# Patient Record
Sex: FEMALE | Race: WHITE | NOT HISPANIC OR LATINO | Employment: FULL TIME | URBAN - METROPOLITAN AREA
[De-identification: names, ages, dates, MRNs, and addresses within clinical notes are randomized per-mention and may not be internally consistent; named-entity substitution may affect disease eponyms.]

---

## 2019-07-05 ENCOUNTER — ANESTHESIA EVENT (OUTPATIENT)
Dept: PERIOP | Facility: HOSPITAL | Age: 29
End: 2019-07-05

## 2019-07-05 ENCOUNTER — HOSPITAL ENCOUNTER (OUTPATIENT)
Facility: HOSPITAL | Age: 29
Setting detail: HOSPITAL OUTPATIENT SURGERY
Discharge: HOME | End: 2019-07-05
Attending: OBSTETRICS & GYNECOLOGY | Admitting: OBSTETRICS & GYNECOLOGY

## 2019-07-05 ENCOUNTER — ANESTHESIA (OUTPATIENT)
Dept: PERIOP | Facility: HOSPITAL | Age: 29
End: 2019-07-05

## 2019-07-05 VITALS
BODY MASS INDEX: 23.31 KG/M2 | HEIGHT: 67 IN | SYSTOLIC BLOOD PRESSURE: 115 MMHG | RESPIRATION RATE: 16 BRPM | WEIGHT: 148.5 LBS | DIASTOLIC BLOOD PRESSURE: 71 MMHG | OXYGEN SATURATION: 99 % | HEART RATE: 64 BPM | TEMPERATURE: 98 F

## 2019-07-05 DIAGNOSIS — O02.1 MISSED ABORTION: ICD-10-CM

## 2019-07-05 LAB
ABO GROUP BLD: NORMAL
BASOPHILS # BLD AUTO: 0.07 10*3/MM3 (ref 0–0.2)
BASOPHILS NFR BLD AUTO: 0.7 % (ref 0–1.5)
BLD GP AB SCN SERPL QL: NEGATIVE
DEPRECATED RDW RBC AUTO: 41.3 FL (ref 37–54)
EOSINOPHIL # BLD AUTO: 0.12 10*3/MM3 (ref 0–0.4)
EOSINOPHIL NFR BLD AUTO: 1.1 % (ref 0.3–6.2)
ERYTHROCYTE [DISTWIDTH] IN BLOOD BY AUTOMATED COUNT: 12.6 % (ref 12.3–15.4)
HCT VFR BLD AUTO: 43.7 % (ref 34–46.6)
HGB BLD-MCNC: 14.9 G/DL (ref 12–15.9)
IMM GRANULOCYTES # BLD AUTO: 0.04 10*3/MM3 (ref 0–0.05)
IMM GRANULOCYTES NFR BLD AUTO: 0.4 % (ref 0–0.5)
LYMPHOCYTES # BLD AUTO: 2.79 10*3/MM3 (ref 0.7–3.1)
LYMPHOCYTES NFR BLD AUTO: 25.9 % (ref 19.6–45.3)
MCH RBC QN AUTO: 30.8 PG (ref 26.6–33)
MCHC RBC AUTO-ENTMCNC: 34.1 G/DL (ref 31.5–35.7)
MCV RBC AUTO: 90.5 FL (ref 79–97)
MONOCYTES # BLD AUTO: 0.82 10*3/MM3 (ref 0.1–0.9)
MONOCYTES NFR BLD AUTO: 7.6 % (ref 5–12)
NEUTROPHILS # BLD AUTO: 6.92 10*3/MM3 (ref 1.7–7)
NEUTROPHILS NFR BLD AUTO: 64.3 % (ref 42.7–76)
NRBC BLD AUTO-RTO: 0 /100 WBC (ref 0–0.2)
PLATELET # BLD AUTO: 290 10*3/MM3 (ref 140–450)
PMV BLD AUTO: 11.2 FL (ref 6–12)
RBC # BLD AUTO: 4.83 10*6/MM3 (ref 3.77–5.28)
RH BLD: POSITIVE
T&S EXPIRATION DATE: NORMAL
WBC NRBC COR # BLD: 10.76 10*3/MM3 (ref 3.4–10.8)

## 2019-07-05 PROCEDURE — 86901 BLOOD TYPING SEROLOGIC RH(D): CPT | Performed by: OBSTETRICS & GYNECOLOGY

## 2019-07-05 PROCEDURE — 85025 COMPLETE CBC W/AUTO DIFF WBC: CPT | Performed by: OBSTETRICS & GYNECOLOGY

## 2019-07-05 PROCEDURE — 25010000002 MIDAZOLAM PER 1 MG: Performed by: ANESTHESIOLOGY

## 2019-07-05 PROCEDURE — 88305 TISSUE EXAM BY PATHOLOGIST: CPT | Performed by: OBSTETRICS & GYNECOLOGY

## 2019-07-05 PROCEDURE — 25010000002 PROPOFOL 10 MG/ML EMULSION: Performed by: NURSE ANESTHETIST, CERTIFIED REGISTERED

## 2019-07-05 PROCEDURE — 25010000002 FENTANYL CITRATE (PF) 100 MCG/2ML SOLUTION: Performed by: NURSE ANESTHETIST, CERTIFIED REGISTERED

## 2019-07-05 PROCEDURE — 86850 RBC ANTIBODY SCREEN: CPT | Performed by: OBSTETRICS & GYNECOLOGY

## 2019-07-05 PROCEDURE — 86900 BLOOD TYPING SEROLOGIC ABO: CPT | Performed by: OBSTETRICS & GYNECOLOGY

## 2019-07-05 PROCEDURE — 25010000002 MIDAZOLAM PER 1 MG: Performed by: NURSE ANESTHETIST, CERTIFIED REGISTERED

## 2019-07-05 RX ORDER — MIDAZOLAM HYDROCHLORIDE 1 MG/ML
INJECTION INTRAMUSCULAR; INTRAVENOUS AS NEEDED
Status: DISCONTINUED | OUTPATIENT
Start: 2019-07-05 | End: 2019-07-05 | Stop reason: SURG

## 2019-07-05 RX ORDER — PROPOFOL 10 MG/ML
VIAL (ML) INTRAVENOUS CONTINUOUS PRN
Status: DISCONTINUED | OUTPATIENT
Start: 2019-07-05 | End: 2019-07-05 | Stop reason: SURG

## 2019-07-05 RX ORDER — OXYCODONE HYDROCHLORIDE AND ACETAMINOPHEN 5; 325 MG/1; MG/1
1 TABLET ORAL EVERY 6 HOURS PRN
Qty: 6 TABLET | Refills: 0 | Status: ON HOLD | OUTPATIENT
Start: 2019-07-05 | End: 2020-04-30

## 2019-07-05 RX ORDER — IBUPROFEN 600 MG/1
600 TABLET ORAL EVERY 6 HOURS PRN
Qty: 20 TABLET | Refills: 0 | Status: ON HOLD | OUTPATIENT
Start: 2019-07-05 | End: 2020-04-30

## 2019-07-05 RX ORDER — ONDANSETRON 2 MG/ML
4 INJECTION INTRAMUSCULAR; INTRAVENOUS ONCE AS NEEDED
Status: DISCONTINUED | OUTPATIENT
Start: 2019-07-05 | End: 2019-07-05 | Stop reason: HOSPADM

## 2019-07-05 RX ORDER — PROPOFOL 10 MG/ML
VIAL (ML) INTRAVENOUS AS NEEDED
Status: DISCONTINUED | OUTPATIENT
Start: 2019-07-05 | End: 2019-07-05 | Stop reason: SURG

## 2019-07-05 RX ORDER — MIDAZOLAM HYDROCHLORIDE 1 MG/ML
1 INJECTION INTRAMUSCULAR; INTRAVENOUS
Status: DISCONTINUED | OUTPATIENT
Start: 2019-07-05 | End: 2019-07-05 | Stop reason: HOSPADM

## 2019-07-05 RX ORDER — DIPHENHYDRAMINE HCL 25 MG
25 CAPSULE ORAL
Status: DISCONTINUED | OUTPATIENT
Start: 2019-07-05 | End: 2019-07-05 | Stop reason: HOSPADM

## 2019-07-05 RX ORDER — PROMETHAZINE HYDROCHLORIDE 25 MG/ML
6.25 INJECTION, SOLUTION INTRAMUSCULAR; INTRAVENOUS
Status: DISCONTINUED | OUTPATIENT
Start: 2019-07-05 | End: 2019-07-05 | Stop reason: HOSPADM

## 2019-07-05 RX ORDER — FENTANYL CITRATE 50 UG/ML
50 INJECTION, SOLUTION INTRAMUSCULAR; INTRAVENOUS
Status: DISCONTINUED | OUTPATIENT
Start: 2019-07-05 | End: 2019-07-05 | Stop reason: HOSPADM

## 2019-07-05 RX ORDER — HYDRALAZINE HYDROCHLORIDE 20 MG/ML
5 INJECTION INTRAMUSCULAR; INTRAVENOUS
Status: DISCONTINUED | OUTPATIENT
Start: 2019-07-05 | End: 2019-07-05 | Stop reason: HOSPADM

## 2019-07-05 RX ORDER — FENTANYL CITRATE 50 UG/ML
INJECTION, SOLUTION INTRAMUSCULAR; INTRAVENOUS AS NEEDED
Status: DISCONTINUED | OUTPATIENT
Start: 2019-07-05 | End: 2019-07-05 | Stop reason: SURG

## 2019-07-05 RX ORDER — HYDROCODONE BITARTRATE AND ACETAMINOPHEN 7.5; 325 MG/1; MG/1
1 TABLET ORAL ONCE AS NEEDED
Status: DISCONTINUED | OUTPATIENT
Start: 2019-07-05 | End: 2019-07-05 | Stop reason: HOSPADM

## 2019-07-05 RX ORDER — ACETAMINOPHEN 325 MG/1
650 TABLET ORAL ONCE AS NEEDED
Status: DISCONTINUED | OUTPATIENT
Start: 2019-07-05 | End: 2019-07-05 | Stop reason: HOSPADM

## 2019-07-05 RX ORDER — LIDOCAINE HYDROCHLORIDE 10 MG/ML
0.5 INJECTION, SOLUTION EPIDURAL; INFILTRATION; INTRACAUDAL; PERINEURAL ONCE AS NEEDED
Status: DISCONTINUED | OUTPATIENT
Start: 2019-07-05 | End: 2019-07-05 | Stop reason: HOSPADM

## 2019-07-05 RX ORDER — OXYCODONE AND ACETAMINOPHEN 7.5; 325 MG/1; MG/1
1 TABLET ORAL ONCE AS NEEDED
Status: DISCONTINUED | OUTPATIENT
Start: 2019-07-05 | End: 2019-07-05 | Stop reason: HOSPADM

## 2019-07-05 RX ORDER — DIPHENHYDRAMINE HYDROCHLORIDE 50 MG/ML
12.5 INJECTION INTRAMUSCULAR; INTRAVENOUS
Status: DISCONTINUED | OUTPATIENT
Start: 2019-07-05 | End: 2019-07-05 | Stop reason: HOSPADM

## 2019-07-05 RX ORDER — SODIUM CHLORIDE 0.9 % (FLUSH) 0.9 %
1-10 SYRINGE (ML) INJECTION AS NEEDED
Status: DISCONTINUED | OUTPATIENT
Start: 2019-07-05 | End: 2019-07-05 | Stop reason: HOSPADM

## 2019-07-05 RX ORDER — LABETALOL HYDROCHLORIDE 5 MG/ML
5 INJECTION, SOLUTION INTRAVENOUS
Status: DISCONTINUED | OUTPATIENT
Start: 2019-07-05 | End: 2019-07-05 | Stop reason: HOSPADM

## 2019-07-05 RX ORDER — ACETAMINOPHEN 325 MG/1
650 TABLET ORAL EVERY 6 HOURS PRN
COMMUNITY
End: 2020-06-05 | Stop reason: HOSPADM

## 2019-07-05 RX ORDER — EPHEDRINE SULFATE 50 MG/ML
5 INJECTION, SOLUTION INTRAVENOUS ONCE AS NEEDED
Status: DISCONTINUED | OUTPATIENT
Start: 2019-07-05 | End: 2019-07-05 | Stop reason: HOSPADM

## 2019-07-05 RX ORDER — HYDROMORPHONE HYDROCHLORIDE 1 MG/ML
0.5 INJECTION, SOLUTION INTRAMUSCULAR; INTRAVENOUS; SUBCUTANEOUS
Status: DISCONTINUED | OUTPATIENT
Start: 2019-07-05 | End: 2019-07-05 | Stop reason: HOSPADM

## 2019-07-05 RX ORDER — FLUMAZENIL 0.1 MG/ML
0.2 INJECTION INTRAVENOUS AS NEEDED
Status: DISCONTINUED | OUTPATIENT
Start: 2019-07-05 | End: 2019-07-05 | Stop reason: HOSPADM

## 2019-07-05 RX ORDER — PROMETHAZINE HYDROCHLORIDE 25 MG/1
25 TABLET ORAL ONCE AS NEEDED
Status: DISCONTINUED | OUTPATIENT
Start: 2019-07-05 | End: 2019-07-05 | Stop reason: HOSPADM

## 2019-07-05 RX ORDER — MIDAZOLAM HYDROCHLORIDE 1 MG/ML
2 INJECTION INTRAMUSCULAR; INTRAVENOUS
Status: DISCONTINUED | OUTPATIENT
Start: 2019-07-05 | End: 2019-07-05 | Stop reason: HOSPADM

## 2019-07-05 RX ORDER — NALOXONE HCL 0.4 MG/ML
0.2 VIAL (ML) INJECTION AS NEEDED
Status: DISCONTINUED | OUTPATIENT
Start: 2019-07-05 | End: 2019-07-05 | Stop reason: HOSPADM

## 2019-07-05 RX ORDER — LIDOCAINE HYDROCHLORIDE 10 MG/ML
INJECTION, SOLUTION EPIDURAL; INFILTRATION; INTRACAUDAL; PERINEURAL AS NEEDED
Status: DISCONTINUED | OUTPATIENT
Start: 2019-07-05 | End: 2019-07-05 | Stop reason: HOSPADM

## 2019-07-05 RX ORDER — MAGNESIUM HYDROXIDE 1200 MG/15ML
LIQUID ORAL AS NEEDED
Status: DISCONTINUED | OUTPATIENT
Start: 2019-07-05 | End: 2019-07-05 | Stop reason: HOSPADM

## 2019-07-05 RX ORDER — FAMOTIDINE 10 MG/ML
20 INJECTION, SOLUTION INTRAVENOUS ONCE
Status: COMPLETED | OUTPATIENT
Start: 2019-07-05 | End: 2019-07-05

## 2019-07-05 RX ORDER — PROMETHAZINE HYDROCHLORIDE 25 MG/ML
12.5 INJECTION, SOLUTION INTRAMUSCULAR; INTRAVENOUS ONCE AS NEEDED
Status: DISCONTINUED | OUTPATIENT
Start: 2019-07-05 | End: 2019-07-05 | Stop reason: HOSPADM

## 2019-07-05 RX ORDER — SODIUM CHLORIDE, SODIUM LACTATE, POTASSIUM CHLORIDE, CALCIUM CHLORIDE 600; 310; 30; 20 MG/100ML; MG/100ML; MG/100ML; MG/100ML
9 INJECTION, SOLUTION INTRAVENOUS CONTINUOUS
Status: DISCONTINUED | OUTPATIENT
Start: 2019-07-05 | End: 2019-07-05 | Stop reason: HOSPADM

## 2019-07-05 RX ORDER — IBUPROFEN 600 MG/1
600 TABLET ORAL ONCE AS NEEDED
Status: COMPLETED | OUTPATIENT
Start: 2019-07-05 | End: 2019-07-05

## 2019-07-05 RX ORDER — LIDOCAINE HYDROCHLORIDE 20 MG/ML
INJECTION, SOLUTION INFILTRATION; PERINEURAL AS NEEDED
Status: DISCONTINUED | OUTPATIENT
Start: 2019-07-05 | End: 2019-07-05 | Stop reason: SURG

## 2019-07-05 RX ORDER — PROMETHAZINE HYDROCHLORIDE 25 MG/1
25 SUPPOSITORY RECTAL ONCE AS NEEDED
Status: DISCONTINUED | OUTPATIENT
Start: 2019-07-05 | End: 2019-07-05 | Stop reason: HOSPADM

## 2019-07-05 RX ADMIN — FAMOTIDINE 20 MG: 10 INJECTION INTRAVENOUS at 10:57

## 2019-07-05 RX ADMIN — SODIUM CHLORIDE, POTASSIUM CHLORIDE, SODIUM LACTATE AND CALCIUM CHLORIDE 9 ML/HR: 600; 310; 30; 20 INJECTION, SOLUTION INTRAVENOUS at 10:57

## 2019-07-05 RX ADMIN — IBUPROFEN 600 MG: 600 TABLET ORAL at 13:07

## 2019-07-05 RX ADMIN — DOXYCYCLINE 100 MG: 100 INJECTION, POWDER, LYOPHILIZED, FOR SOLUTION INTRAVENOUS at 11:52

## 2019-07-05 RX ADMIN — PROPOFOL 30 MG: 10 INJECTION, EMULSION INTRAVENOUS at 11:44

## 2019-07-05 RX ADMIN — PROPOFOL 200 MCG/KG/MIN: 10 INJECTION, EMULSION INTRAVENOUS at 11:44

## 2019-07-05 RX ADMIN — Medication 1 MG: at 11:41

## 2019-07-05 RX ADMIN — MIDAZOLAM HYDROCHLORIDE 2 MG: 1 INJECTION, SOLUTION INTRAMUSCULAR; INTRAVENOUS at 10:57

## 2019-07-05 RX ADMIN — FENTANYL CITRATE 25 MCG: 50 INJECTION INTRAMUSCULAR; INTRAVENOUS at 11:41

## 2019-07-05 RX ADMIN — FENTANYL CITRATE 25 MCG: 50 INJECTION INTRAMUSCULAR; INTRAVENOUS at 11:45

## 2019-07-05 RX ADMIN — LIDOCAINE HYDROCHLORIDE 60 MG: 20 INJECTION, SOLUTION INFILTRATION; PERINEURAL at 11:44

## 2019-07-05 NOTE — ANESTHESIA POSTPROCEDURE EVALUATION
Patient: Xochilt Oreilly    Procedure Summary     Date:  07/05/19 Room / Location:  [facility] OR 03 / [facility] MAIN OR    Anesthesia Start:  1141 Anesthesia Stop:  1219    Procedure:  SUCTION DILATATION AND CURETTAGE (N/A Vagina) Diagnosis:      Surgeon:  Kathy Bai MD Provider:  Felipe Butterfield MD    Anesthesia Type:  MAC ASA Status:  2          Anesthesia Type: MAC  Last vitals  BP   116/67 (07/05/19 1305)   Temp   36.7 °C (98 °F) (07/05/19 1235)   Pulse   60 (07/05/19 1305)   Resp   16 (07/05/19 1305)     SpO2   99 % (07/05/19 1305)     Post Anesthesia Care and Evaluation    Patient location during evaluation: bedside  Patient participation: complete - patient participated  Level of consciousness: awake and alert  Pain management: adequate  Airway patency: patent  Anesthetic complications: No anesthetic complications  PONV Status: controlled  Cardiovascular status: acceptable  Respiratory status: acceptable  Hydration status: acceptable

## 2019-07-05 NOTE — OP NOTE
Subjective     Patient Name: Xochilt Oreilly  :  1990  MRN:  [id number]      Date of Service:  19    Surgeon: Kathy Bai MD     Pre-Operative Diagnosis:   6 week Missed     Post-Operative Diagnosis:    6 week Missed     Procedure:    DILATATION AND CURETTAGE WITH SUCTION     Anesthesia: Type: Monitor Anesthesia Care       EBL: 20 cc    Specimen removed:  Products of Conception     Findings: Exam under anesthesia revealed an anteverted, mildly enlarged uterus. Moderate POCs.         Procedure:   Patient was taken operating room where IV sedation was obtained without difficulty.  She was then placed in the dorsal lithotomy position in stirrups and prepped and draped in the normal sterile fashion.  A speculum was placed within the vagina a paracervical block was then performed injecting a  1% lidocaine at the 4 and 7:00 positions of the cervical vaginal junction.  10 cc was injected at each site. The anterior lip of the cervix grasped with a tenaculum.  The cervix was then gently dilated in order to allow passage of a 7  mm curved suction curette.  Suction was tested and noted to be at 60 mmHg.  The curette was then advanced through the cervix up to the uterine fundus and suction was applied with return of moderate amount of products of conception.  Several additional passages with the curette were performed until minimal tissue was noted.  A #1  Sharp curette was then used to perform a sharp curettage and a gritty texture was noted within the cavity and no additional tissue was obtained.  A final passage with the suction curette was performed with no additional tissue noted. The  tenaculum and speculum was then removed.  Sponge and needle counts are correct ×2.  She was awakened from anesthesia and taken to face to recovery in stable condition.     Kathy Bai MD   2019   3:16 PM

## 2019-07-05 NOTE — ANESTHESIA PREPROCEDURE EVALUATION
Anesthesia Evaluation     Patient summary reviewed and Nursing notes reviewed   NPO Solid Status: > 8 hours  NPO Liquid Status: > 2 hours           Airway   Mallampati: II  Neck ROM: full  No difficulty expected  Dental - normal exam     Pulmonary     breath sounds clear to auscultation  Cardiovascular     Rhythm: regular        Neuro/Psych  GI/Hepatic/Renal/Endo    (+)  GERD,      Musculoskeletal     Abdominal    Substance History      OB/GYN    (+) Pregnant,         Other                        Anesthesia Plan    ASA 2     MAC     intravenous induction   Anesthetic plan, all risks, benefits, and alternatives have been provided, discussed and informed consent has been obtained with: patient.

## 2019-07-05 NOTE — DISCHARGE INSTRUCTIONS
**SEE GRIEF RESOURCES ATTACHED**    Moderate Conscious Sedation, Adult, Care After  Refer to this sheet in the next few weeks. These instructions provide you with information on caring for yourself after your procedure. Your health care provider may also give you more specific instructions. Your treatment has been planned according to current medical practices, but problems sometimes occur. Call your health care provider if you have any problems or questions after your procedure.  WHAT TO EXPECT AFTER THE PROCEDURE   After your procedure:  · You may feel sleepy, clumsy, and have poor balance for several hours.  · Vomiting may occur if you eat too soon after the procedure.  HOME CARE INSTRUCTIONS  · Do not participate in any activities where you could become injured for at least 24 hours. Do not:    Drive.    Swim.    Ride a bicycle.    Operate heavy machinery.    Cook.    Use power tools.    Climb ladders.    Work from a high place.  · Do not make important decisions or sign legal documents until you are improved.  · If you vomit, drink water, juice, or soup when you can drink without vomiting. Make sure you have little or no nausea before eating solid foods.  · Only take over-the-counter or prescription medicines for pain, discomfort, or fever as directed by your health care provider.  · Make sure you and your family fully understand everything about the medicines given to you, including what side effects may occur.  · You should not drink alcohol, take sleeping pills, or take medicines that cause drowsiness for at least 24 hours.  · If you smoke, do not smoke without supervision.  · If you are feeling better, you may resume normal activities 24 hours after you were sedated.  · Keep all appointments with your health care provider.  · You should have a responsible adult stay with you for the first 24 hours post procedure.  SEEK MEDICAL CARE IF:  · Your skin is pale or bluish in color.  · You continue to feel  nauseous or vomit.  · Your pain is getting worse and is not helped by medicine.  · You have bleeding or swelling.  · You are still sleepy or feeling clumsy after 24 hours.  SEEK IMMEDIATE MEDICAL CARE IF:  · You develop a rash.  · You have difficulty breathing.  · You develop any type of allergic problem.  · You have a fever.  MAKE SURE YOU:  · Understand these instructions.  · Will watch your condition.  · Will get help right away if you are not doing well or get worse.     This information is not intended to replace advice given to you by your health care provider. Make sure you discuss any questions you have with your health care provider.     Document Released: 10/08/2014 Document Revised: 01/08/2016 Document Reviewed: 10/08/2014  Elsevier Interactive Patient Education ©2016 [employer]

## 2019-07-06 LAB
CYTO UR: NORMAL
LAB AP CASE REPORT: NORMAL
PATH REPORT.FINAL DX SPEC: NORMAL
PATH REPORT.GROSS SPEC: NORMAL

## 2019-10-17 LAB
EXTERNAL HEPATITIS B SURFACE ANTIGEN: NEGATIVE
EXTERNAL HEPATITIS C AB: NORMAL
EXTERNAL RUBELLA QUALITATIVE: NORMAL
EXTERNAL SYPHILIS RPR SCREEN: NORMAL
HIV1 P24 AG SERPL QL IA: NEGATIVE

## 2020-04-30 ENCOUNTER — HOSPITAL ENCOUNTER (EMERGENCY)
Facility: HOSPITAL | Age: 30
Discharge: HOME | End: 2020-05-01
Attending: OBSTETRICS & GYNECOLOGY | Admitting: OBSTETRICS & GYNECOLOGY

## 2020-04-30 VITALS
DIASTOLIC BLOOD PRESSURE: 79 MMHG | RESPIRATION RATE: 18 BRPM | HEIGHT: 66 IN | TEMPERATURE: 98.4 F | WEIGHT: 189 LBS | SYSTOLIC BLOOD PRESSURE: 118 MMHG | BODY MASS INDEX: 30.37 KG/M2 | HEART RATE: 91 BPM

## 2020-04-30 PROBLEM — O13.3 GESTATIONAL HYPERTENSION WITHOUT SIGNIFICANT PROTEINURIA IN THIRD TRIMESTER: Status: ACTIVE | Noted: 2020-04-30

## 2020-04-30 LAB
ALBUMIN SERPL-MCNC: 3.3 G/DL (ref 3.5–5.2)
ALBUMIN/GLOB SERPL: 1.1 G/DL
ALP SERPL-CCNC: 98 U/L (ref 39–117)
ALT SERPL W P-5'-P-CCNC: 13 U/L (ref 1–33)
ANION GAP SERPL CALCULATED.3IONS-SCNC: 11.4 MMOL/L (ref 5–15)
AST SERPL-CCNC: 15 U/L (ref 1–32)
BASOPHILS # BLD AUTO: 0.05 10*3/MM3 (ref 0–0.2)
BASOPHILS NFR BLD AUTO: 0.4 % (ref 0–1.5)
BILIRUB SERPL-MCNC: 0.2 MG/DL (ref 0.2–1.2)
BUN BLD-MCNC: 8 MG/DL (ref 6–20)
BUN/CREAT SERPL: 16 (ref 7–25)
CALCIUM SPEC-SCNC: 9.5 MG/DL (ref 8.6–10.5)
CHLORIDE SERPL-SCNC: 100 MMOL/L (ref 98–107)
CO2 SERPL-SCNC: 21.6 MMOL/L (ref 22–29)
CREAT BLD-MCNC: 0.5 MG/DL (ref 0.57–1)
CREAT UR-MCNC: 101.7 MG/DL
DEPRECATED RDW RBC AUTO: 44.6 FL (ref 37–54)
EOSINOPHIL # BLD AUTO: 0.15 10*3/MM3 (ref 0–0.4)
EOSINOPHIL NFR BLD AUTO: 1.1 % (ref 0.3–6.2)
ERYTHROCYTE [DISTWIDTH] IN BLOOD BY AUTOMATED COUNT: 13.2 % (ref 12.3–15.4)
GFR SERPL CREATININE-BSD FRML MDRD: 144 ML/MIN/1.73
GLOBULIN UR ELPH-MCNC: 3.1 GM/DL
GLUCOSE BLD-MCNC: 94 MG/DL (ref 65–99)
HCT VFR BLD AUTO: 38.3 % (ref 34–46.6)
HGB BLD-MCNC: 13.2 G/DL (ref 12–15.9)
IMM GRANULOCYTES # BLD AUTO: 0.1 10*3/MM3 (ref 0–0.05)
IMM GRANULOCYTES NFR BLD AUTO: 0.7 % (ref 0–0.5)
LYMPHOCYTES # BLD AUTO: 3.06 10*3/MM3 (ref 0.7–3.1)
LYMPHOCYTES NFR BLD AUTO: 22.3 % (ref 19.6–45.3)
MCH RBC QN AUTO: 31.7 PG (ref 26.6–33)
MCHC RBC AUTO-ENTMCNC: 34.5 G/DL (ref 31.5–35.7)
MCV RBC AUTO: 91.8 FL (ref 79–97)
MONOCYTES # BLD AUTO: 1.32 10*3/MM3 (ref 0.1–0.9)
MONOCYTES NFR BLD AUTO: 9.6 % (ref 5–12)
NEUTROPHILS # BLD AUTO: 9.03 10*3/MM3 (ref 1.7–7)
NEUTROPHILS NFR BLD AUTO: 65.9 % (ref 42.7–76)
NRBC BLD AUTO-RTO: 0.1 /100 WBC (ref 0–0.2)
PLATELET # BLD AUTO: 195 10*3/MM3 (ref 140–450)
PMV BLD AUTO: 12.1 FL (ref 6–12)
POTASSIUM BLD-SCNC: 3.6 MMOL/L (ref 3.5–5.2)
PROT SERPL-MCNC: 6.4 G/DL (ref 6–8.5)
PROT UR-MCNC: 23 MG/DL
PROT/CREAT UR: 226.2 MG/G CREA (ref 0–200)
RBC # BLD AUTO: 4.17 10*6/MM3 (ref 3.77–5.28)
SODIUM BLD-SCNC: 133 MMOL/L (ref 136–145)
WBC NRBC COR # BLD: 13.71 10*3/MM3 (ref 3.4–10.8)

## 2020-04-30 PROCEDURE — 80053 COMPREHEN METABOLIC PANEL: CPT | Performed by: OBSTETRICS & GYNECOLOGY

## 2020-04-30 PROCEDURE — 99283 EMERGENCY DEPT VISIT LOW MDM: CPT

## 2020-04-30 PROCEDURE — 87086 URINE CULTURE/COLONY COUNT: CPT | Performed by: OBSTETRICS & GYNECOLOGY

## 2020-04-30 PROCEDURE — 59025 FETAL NON-STRESS TEST: CPT

## 2020-04-30 PROCEDURE — 81001 URINALYSIS AUTO W/SCOPE: CPT | Performed by: OBSTETRICS & GYNECOLOGY

## 2020-04-30 PROCEDURE — 85025 COMPLETE CBC W/AUTO DIFF WBC: CPT | Performed by: OBSTETRICS & GYNECOLOGY

## 2020-04-30 PROCEDURE — 84156 ASSAY OF PROTEIN URINE: CPT | Performed by: OBSTETRICS & GYNECOLOGY

## 2020-04-30 PROCEDURE — 82570 ASSAY OF URINE CREATININE: CPT | Performed by: OBSTETRICS & GYNECOLOGY

## 2020-04-30 RX ORDER — PRENATAL VIT NO.126/IRON/FOLIC 28MG-0.8MG
1 TABLET ORAL DAILY
COMMUNITY
End: 2021-03-29

## 2020-05-01 LAB
AMORPH URATE CRY URNS QL MICRO: ABNORMAL /HPF
BACTERIA UR QL AUTO: ABNORMAL /HPF
BILIRUB UR QL STRIP: NEGATIVE
CLARITY UR: ABNORMAL
COD CRY URNS QL: ABNORMAL /HPF
COLOR UR: YELLOW
GLUCOSE UR STRIP-MCNC: NEGATIVE MG/DL
HGB UR QL STRIP.AUTO: ABNORMAL
HYALINE CASTS UR QL AUTO: ABNORMAL /LPF
KETONES UR QL STRIP: NEGATIVE
LEUKOCYTE ESTERASE UR QL STRIP.AUTO: ABNORMAL
NITRITE UR QL STRIP: NEGATIVE
PH UR STRIP.AUTO: 7 [PH] (ref 5–8)
PROT UR QL STRIP: ABNORMAL
RBC # UR: ABNORMAL /HPF
REF LAB TEST METHOD: ABNORMAL
SP GR UR STRIP: 1.02 (ref 1–1.03)
SQUAMOUS #/AREA URNS HPF: ABNORMAL /HPF
UROBILINOGEN UR QL STRIP: ABNORMAL
WBC UR QL AUTO: ABNORMAL /HPF

## 2020-05-01 PROCEDURE — 99283 EMERGENCY DEPT VISIT LOW MDM: CPT

## 2020-05-01 PROCEDURE — 59025 FETAL NON-STRESS TEST: CPT

## 2020-05-01 NOTE — OBED NOTES
[facility] Provider Note        2020 23:03    Xochilt Oreilly is a 30 y.o.  at 34w2d RENAN  2020, by Other Basis who presents for : Elevated Blood Pressure (140/93.  +FM, Rare contraction, Denies leakage or bleeding)          HPI:     31yo  at 34w2d. Recently seen in office and has been being evaluated for elevated BPs.  Turned in 24hr urine yesterday but does not know results.  Felt not well at home and checked he BP several times and said it averaged 140s/90s. Called and was directed to be evaluated.    On arrival patient denies HA, blurry vision, RUQ or epigastric pain.  She has no obstetric complaints       No VB/LOF/HA/visual changes/N/V/F/C/dysuria     Active fetus Yes   denies ROM  deniescontractions, cramping   denies bleeding    Prenatal care with Kathy Bai MD complicated by evaluation for elevated BP this week., BMI >30    There are no active problems to display for this patient.        POB:   OB History    Para Term  AB Living   2 0 0 0 1 0   SAB TAB Ectopic Molar Multiple Live Births   1 0 0 0 0 0      # Outcome Date GA Lbr Chandu/2nd Weight Sex Delivery Anes PTL Lv   2 Current            1 SAB 19 10w0d             PMH:   Past Medical History:   Diagnosis Date   • Anxiety     had short term treatmeny    • Environmental allergies    • GERD (gastroesophageal reflux disease)    • Gestational hypertension     elevated for 1 week   • Hypoglycemia    • Miscarriage      PSH:   Past Surgical History:   Procedure Laterality Date   • D&C WITH SUCTION N/A 2019    Procedure: SUCTION DILATATION AND CURETTAGE;  Surgeon: Kathy Bai MD;  Location: [facility] MAIN OR;  Service: Obstetrics/Gynecology   • EAR TUBES     • TONSILLECTOMY       FH:    Family History   Problem Relation Age of Onset   • Migraines Mother    • No Known Problems Father    • No Known Problems Sister    • Malig Hyperthermia Neg Hx      SH:   Social History     Tobacco Use   • Smoking  "status: Never Smoker   • Smokeless tobacco: Never Used   Substance Use Topics   • Alcohol use: No   • Drug use: No       Allergies: Norco [hydrocodone-acetaminophen]; Septra [sulfamethoxazole-trimethoprim]; and Shellfish-derived products    Medications:   Medications Prior to Admission   Medication Sig Dispense Refill Last Dose   • acetaminophen (TYLENOL) 325 MG tablet Take 650 mg by mouth Every 6 (Six) Hours As Needed for Mild Pain .   Past Week at Unknown time   • cetirizine (zyrTEC) 10 MG tablet Take 10 mg by mouth Daily.   4/29/2020 at 2130   • Prenatal Vit-Fe Fumarate-FA (PRENATAL, CLASSIC, VITAMIN) 28-0.8 MG tablet tablet Take 1 tablet by mouth Daily.   4/29/2020 at 2030   • psyllium (METAMUCIL) 58.6 % packet Take 1 packet by mouth Daily.   4/29/2020 at 2130   • B Complex Vitamins (VITAMIN-B COMPLEX PO) Take 1 tablet by mouth Daily.   More than a month at Unknown time   • magnesium oxide (MAG-OX) 400 tablet tablet Take 400 mg by mouth Daily.   More than a month at Unknown time       Review of Systems  A 14 system review was completed and negative except for what is noted in the HPI or below  Pertinent items are noted in HPI, all other systems reviewed and negative      Physical exam    Temp:  [98.4 °F (36.9 °C)] 98.4 °F (36.9 °C)  Heart Rate:  [110-118] 110  Resp:  [18] 18  BP: (125-137)/(85-87) 125/87  Estimated body mass index is 30.51 kg/m² as calculated from the following:    Height as of this encounter: 167.6 cm (66\").    Weight as of this encounter: 85.7 kg (189 lb).    General appearance - alert, well appearing, and in no distress and oriented to person, place, and time  Abdomen - soft, nontender, nondistended, no masses or organomegaly  no rebound tenderness noted  NO RUQ or epigastric tenderness. Gravid, uterus nontender  Extremities - mild non-pitting bilateral lower extremity edema, DTRs 2+, NO clonus      Uterine Activity Assessment  Method: palpation, external tocotransducer    Membranes: Intact   "                  U/S reviewed: not performed.       External Prenatal Results     Pregnancy Outside Results - Transcribed From Office Records - See Scanned Records For Details     Test Value Date Time    Hgb 14.9 g/dL 07/05/19 1001    Hct 43.7 % 07/05/19 1001    ABO A  07/05/19 1002    Rh Positive  07/05/19 1002    Antibody Screen Negative  07/05/19 1002    Glucose Fasting GTT       Glucose Tolerance Test 1 hour       Glucose Tolerance Test 3 hour       Gonorrhea (discrete)       Chlamydia (discrete)       RPR       VDRL       Syphilis Antibody       Rubella       HBsAg       Herpes Simplex Virus PCR       Herpes Simplex VIrus Culture       HIV       Hep C RNA Quant PCR       Hep C Antibody       AFP       Group B Strep       GBS Susceptibility to Clindamycin       GBS Susceptibility to Erythromycin       Fetal Fibronectin       Genetic Testing, Maternal Blood             Drug Screening     Test Value Date Time    Urine Drug Screen       Amphetamine Screen       Barbiturate Screen       Benzodiazepine Screen       Methadone Screen       Phencyclidine Screen       Opiates Screen       THC Screen       Cocaine Screen       Propoxyphene Screen       Buprenorphine Screen       Methamphetamine Screen       Oxycodone Screen       Tricyclic Antidepressants Screen                 CBC w/ diff:   Lab Results   Component Value Date     04/30/2020    HGB 13.2 04/30/2020    HCT 38.3 04/30/2020    MCV 91.8 04/30/2020    RDW 13.2 04/30/2020    WBC 13.71 (H) 04/30/2020    NEUTRORELPCT 65.9 04/30/2020    AUTOIGPER 0.7 (H) 04/30/2020    LYMPHORELPCT 22.3 04/30/2020    MONORELPCT 9.6 04/30/2020    EOSRELPCT 1.1 04/30/2020    BASORELPCT 0.4 04/30/2020     CMP:   Lab Results   Component Value Date     (L) 04/30/2020    K 3.6 04/30/2020     04/30/2020    CO2 21.6 (L) 04/30/2020    BUN 8 04/30/2020    CREATININE 0.50 (L) 04/30/2020    GLUCOSE 94 04/30/2020    ALBUMIN 3.30 (L) 04/30/2020    CALCIUM 9.5 04/30/2020    AST  15 2020    ALT 13 2020    BILITOT 0.2 2020     UA:  No results found for: SQUAMEPIUA, SPECGRAVUR, KETONESU, BLOODU, LEUKOCYTESUR, NITRITEU, RBCUA, WBCUA, BACTERIA     URINE PROT/Cr RATIO: 0.22    NST REACTIVE  [place]: no contractions    Impression:   @ 34w2d .  Final Diagnosis:  1. Elevated Blood pressure in pregnancy - NORMAL Blood Pressure here in [facility]   Normal PIH labs    Plan:  1. Discharge to home.    2. Plan of care has been reviewed with patient and    3.  Risks, benefits of treatment plan have been discussed.  4.  All questions have been answered.  5.  PIH precautions given  6. Obstetric precautions  7.  Call office in am to review visit at [facility] and review next prenatal visit appointment date     Roscoe Luz MD  2020  23:03

## 2020-05-02 LAB — BACTERIA SPEC AEROBE CULT: ABNORMAL

## 2020-05-11 LAB — EXTERNAL GROUP B STREP ANTIGEN: NEGATIVE

## 2020-06-02 ENCOUNTER — ANESTHESIA EVENT (OUTPATIENT)
Dept: LABOR AND DELIVERY | Facility: HOSPITAL | Age: 30
End: 2020-06-02

## 2020-06-02 ENCOUNTER — ANESTHESIA (OUTPATIENT)
Dept: LABOR AND DELIVERY | Facility: HOSPITAL | Age: 30
End: 2020-06-02

## 2020-06-02 ENCOUNTER — HOSPITAL ENCOUNTER (INPATIENT)
Facility: HOSPITAL | Age: 30
LOS: 3 days | Discharge: HOME | End: 2020-06-05
Attending: OBSTETRICS & GYNECOLOGY | Admitting: OBSTETRICS & GYNECOLOGY

## 2020-06-02 PROBLEM — Z34.90 PREGNANCY: Status: ACTIVE | Noted: 2020-06-02

## 2020-06-02 LAB
ABO GROUP BLD: NORMAL
ALBUMIN SERPL-MCNC: 3.3 G/DL (ref 3.5–5.2)
ALBUMIN/GLOB SERPL: 1.2 G/DL
ALP SERPL-CCNC: 113 U/L (ref 39–117)
ALT SERPL W P-5'-P-CCNC: 11 U/L (ref 1–33)
ANION GAP SERPL CALCULATED.3IONS-SCNC: 11.7 MMOL/L (ref 5–15)
AST SERPL-CCNC: 17 U/L (ref 1–32)
BASOPHILS # BLD AUTO: 0.03 10*3/MM3 (ref 0–0.2)
BASOPHILS NFR BLD AUTO: 0.3 % (ref 0–1.5)
BILIRUB SERPL-MCNC: 0.3 MG/DL (ref 0.2–1.2)
BLD GP AB SCN SERPL QL: NEGATIVE
BUN BLD-MCNC: 11 MG/DL (ref 6–20)
BUN/CREAT SERPL: 20 (ref 7–25)
CALCIUM SPEC-SCNC: 9.1 MG/DL (ref 8.6–10.5)
CHLORIDE SERPL-SCNC: 100 MMOL/L (ref 98–107)
CO2 SERPL-SCNC: 20.3 MMOL/L (ref 22–29)
CREAT BLD-MCNC: 0.55 MG/DL (ref 0.57–1)
DEPRECATED RDW RBC AUTO: 44.2 FL (ref 37–54)
EOSINOPHIL # BLD AUTO: 0.04 10*3/MM3 (ref 0–0.4)
EOSINOPHIL NFR BLD AUTO: 0.3 % (ref 0.3–6.2)
ERYTHROCYTE [DISTWIDTH] IN BLOOD BY AUTOMATED COUNT: 13.1 % (ref 12.3–15.4)
EXPIRATION DATE: ABNORMAL
GFR SERPL CREATININE-BSD FRML MDRD: 129 ML/MIN/1.73
GLOBULIN UR ELPH-MCNC: 2.7 GM/DL
GLUCOSE BLD-MCNC: 92 MG/DL (ref 65–99)
HCT VFR BLD AUTO: 40.1 % (ref 34–46.6)
HGB BLD-MCNC: 14 G/DL (ref 12–15.9)
LYMPHOCYTES # BLD AUTO: 2.11 10*3/MM3 (ref 0.7–3.1)
LYMPHOCYTES NFR BLD AUTO: 17.9 % (ref 19.6–45.3)
Lab: ABNORMAL
MCH RBC QN AUTO: 32 PG (ref 26.6–33)
MCHC RBC AUTO-ENTMCNC: 34.9 G/DL (ref 31.5–35.7)
MCV RBC AUTO: 91.8 FL (ref 79–97)
MONOCYTES # BLD AUTO: 1 10*3/MM3 (ref 0.1–0.9)
MONOCYTES NFR BLD AUTO: 8.5 % (ref 5–12)
NEUTROPHILS # BLD AUTO: 8.53 10*3/MM3 (ref 1.7–7)
NEUTROPHILS NFR BLD AUTO: 72.2 % (ref 42.7–76)
PLATELET # BLD AUTO: 148 10*3/MM3 (ref 140–450)
PMV BLD AUTO: 13.2 FL (ref 6–12)
POTASSIUM BLD-SCNC: 3.9 MMOL/L (ref 3.5–5.2)
PROT SERPL-MCNC: 6 G/DL (ref 6–8.5)
PROT UR STRIP-MCNC: ABNORMAL MG/DL
RBC # BLD AUTO: 4.37 10*6/MM3 (ref 3.77–5.28)
RH BLD: POSITIVE
SARS-COV-2 RNA RESP QL NAA+PROBE: NOT DETECTED
SODIUM BLD-SCNC: 132 MMOL/L (ref 136–145)
T&S EXPIRATION DATE: NORMAL
WBC NRBC COR # BLD: 11.81 10*3/MM3 (ref 3.4–10.8)

## 2020-06-02 PROCEDURE — 81002 URINALYSIS NONAUTO W/O SCOPE: CPT | Performed by: OBSTETRICS & GYNECOLOGY

## 2020-06-02 PROCEDURE — 86850 RBC ANTIBODY SCREEN: CPT | Performed by: OBSTETRICS & GYNECOLOGY

## 2020-06-02 PROCEDURE — 80053 COMPREHEN METABOLIC PANEL: CPT | Performed by: OBSTETRICS & GYNECOLOGY

## 2020-06-02 PROCEDURE — 87635 SARS-COV-2 COVID-19 AMP PRB: CPT | Performed by: OBSTETRICS & GYNECOLOGY

## 2020-06-02 PROCEDURE — 10907ZC DRAINAGE OF AMNIOTIC FLUID, THERAPEUTIC FROM PRODUCTS OF CONCEPTION, VIA NATURAL OR ARTIFICIAL OPENING: ICD-10-PCS | Performed by: OBSTETRICS & GYNECOLOGY

## 2020-06-02 PROCEDURE — 85025 COMPLETE CBC W/AUTO DIFF WBC: CPT | Performed by: OBSTETRICS & GYNECOLOGY

## 2020-06-02 PROCEDURE — 3E0P7VZ INTRODUCTION OF HORMONE INTO FEMALE REPRODUCTIVE, VIA NATURAL OR ARTIFICIAL OPENING: ICD-10-PCS | Performed by: OBSTETRICS & GYNECOLOGY

## 2020-06-02 PROCEDURE — 86900 BLOOD TYPING SEROLOGIC ABO: CPT | Performed by: OBSTETRICS & GYNECOLOGY

## 2020-06-02 PROCEDURE — 3E033VJ INTRODUCTION OF OTHER HORMONE INTO PERIPHERAL VEIN, PERCUTANEOUS APPROACH: ICD-10-PCS | Performed by: OBSTETRICS & GYNECOLOGY

## 2020-06-02 PROCEDURE — 86901 BLOOD TYPING SEROLOGIC RH(D): CPT | Performed by: OBSTETRICS & GYNECOLOGY

## 2020-06-02 RX ORDER — ONDANSETRON 2 MG/ML
4 INJECTION INTRAMUSCULAR; INTRAVENOUS ONCE AS NEEDED
Status: COMPLETED | OUTPATIENT
Start: 2020-06-02 | End: 2020-06-03

## 2020-06-02 RX ORDER — DIPHENHYDRAMINE HYDROCHLORIDE 50 MG/ML
12.5 INJECTION INTRAMUSCULAR; INTRAVENOUS EVERY 8 HOURS PRN
Status: DISCONTINUED | OUTPATIENT
Start: 2020-06-02 | End: 2020-06-03 | Stop reason: HOSPADM

## 2020-06-02 RX ORDER — SODIUM CHLORIDE 0.9 % (FLUSH) 0.9 %
10 SYRINGE (ML) INJECTION EVERY 12 HOURS SCHEDULED
Status: DISCONTINUED | OUTPATIENT
Start: 2020-06-02 | End: 2020-06-03

## 2020-06-02 RX ORDER — SODIUM CHLORIDE, SODIUM LACTATE, POTASSIUM CHLORIDE, CALCIUM CHLORIDE 600; 310; 30; 20 MG/100ML; MG/100ML; MG/100ML; MG/100ML
125 INJECTION, SOLUTION INTRAVENOUS CONTINUOUS
Status: DISCONTINUED | OUTPATIENT
Start: 2020-06-02 | End: 2020-06-03

## 2020-06-02 RX ORDER — FAMOTIDINE 10 MG/ML
20 INJECTION, SOLUTION INTRAVENOUS ONCE AS NEEDED
Status: COMPLETED | OUTPATIENT
Start: 2020-06-02 | End: 2020-06-03

## 2020-06-02 RX ORDER — EPHEDRINE SULFATE 50 MG/ML
5 INJECTION, SOLUTION INTRAVENOUS
Status: DISCONTINUED | OUTPATIENT
Start: 2020-06-02 | End: 2020-06-03 | Stop reason: HOSPADM

## 2020-06-02 RX ORDER — SODIUM CHLORIDE 0.9 % (FLUSH) 0.9 %
10 SYRINGE (ML) INJECTION AS NEEDED
Status: DISCONTINUED | OUTPATIENT
Start: 2020-06-02 | End: 2020-06-03

## 2020-06-02 RX ADMIN — SODIUM CHLORIDE, POTASSIUM CHLORIDE, SODIUM LACTATE AND CALCIUM CHLORIDE 125 ML/HR: 600; 310; 30; 20 INJECTION, SOLUTION INTRAVENOUS at 17:29

## 2020-06-02 RX ADMIN — DINOPROSTONE 10 MG: 10 INSERT VAGINAL at 13:19

## 2020-06-02 RX ADMIN — SODIUM CHLORIDE, POTASSIUM CHLORIDE, SODIUM LACTATE AND CALCIUM CHLORIDE 125 ML/HR: 600; 310; 30; 20 INJECTION, SOLUTION INTRAVENOUS at 13:23

## 2020-06-02 RX ADMIN — SODIUM CHLORIDE, POTASSIUM CHLORIDE, SODIUM LACTATE AND CALCIUM CHLORIDE 500 ML: 600; 310; 30; 20 INJECTION, SOLUTION INTRAVENOUS at 10:21

## 2020-06-02 NOTE — H&P
[facility]  Obstetric History and Physical    Patient Name: Xochilt Oreilly  :  1990  MRN:  [id number]      Chief Complaint   Patient presents with   • sent down from office     pt sent down from Dr. Bai' office for high blood pressures. Pt reports 160/100 bps in office. Pt denies KABA or visual changes. +Fm and LOF       Subjective     Patient is a 30 y.o. female  currently at 39w0d, who presents From the office for evaluation of hypertension in pregnancy with a blood pressure of 160/100.  Since admission blood pressures have been more labile ranging in the 140-150/80-90 range.  Preeclamptic lab work is normal.  Patient states she feels good fetal movement.  Denies any complaints of headache or visual changes..        Objective       Vital Signs Range for the last 24 hours  Temperature: Temp:  [98.1 °F (36.7 °C)] 98.1 °F (36.7 °C)   Temp Source: Temp src: Oral   BP: BP: (133-153)/() 133/85   Pulse: Heart Rate:  [101-150] 110   Respirations: Resp:  [18] 18                   Physical Examination:     General :  Alert in NAD  Abdomen: Gravid, NT    Presentation: vtx   Cervix: Exam by:  Dr Bai   Dilation:  1   Effacement:  long             Fetal Heart Rate Assessment   Method: Fetal HR Assessment Method: external   Beats/min: Fetal HR (beats/min): 145   Baseline: Fetal Heart Baseline Rate: normal range   Varibility: Fetal HR Variability: moderate (amplitude range 6 to 25 bpm)   Accels: Fetal HR Accelerations: greater than/equal to 15 bpm, lasting at least 15 seconds           Results from last 7 days   Lab Units 20  1020   WBC 10*3/mm3 11.81*   HEMOGLOBIN g/dL 14.0   HEMATOCRIT % 40.1   PLATELETS 10*3/mm3 148       Assessment/Plan     1.  Intrauterine pregnancy at 39w0d weeks gestation With PIH.  Patient cervical exam is unfavorable.  Plan cervadil for cervical ripening.   reactive fetal status.  Anticipate .  Plan of care has been reviewed with patient and family.  All questions  answered.      Pregnancy        H&P updated. The patient was examined and the following changes are noted above.         Tamy Sims MD  6/2/2020  13:32

## 2020-06-02 NOTE — ANESTHESIA PREPROCEDURE EVALUATION
Anesthesia Evaluation     Patient summary reviewed and Nursing notes reviewed   no history of anesthetic complications:  NPO Solid Status: > 6 hours  NPO Liquid Status: > 6 hours           Airway   Mallampati: II  TM distance: >3 FB  Neck ROM: full  no difficulty expected and No difficulty expected  Dental - normal exam     Pulmonary - negative pulmonary ROS and normal exam    breath sounds clear to auscultation  (-) rhonchi, decreased breath sounds, wheezes, rales, stridor  Cardiovascular - negative cardio ROS and normal exam    NYHA Classification: I  Rhythm: regular  Rate: normal    (-) murmur, weak pulses, friction rub, systolic click, carotid bruits, JVD, peripheral edema      Neuro/Psych- negative ROS  GI/Hepatic/Renal/Endo    (+)  GERD,      Musculoskeletal (-) negative ROS    Abdominal  - normal exam    Abdomen: soft.   Substance History - negative use     OB/GYN    (+) Pregnant, pregnancy induced hypertension        Other - negative ROS                       Anesthesia Plan    ASA 2     epidural     intravenous induction     Anesthetic plan, all risks, benefits, and alternatives have been provided, discussed and informed consent has been obtained with: patient.

## 2020-06-03 LAB
ATMOSPHERIC PRESS: 749.6 MMHG
BASE EXCESS BLDCOA CALC-SCNC: -3.1 MMOL/L
BASOPHILS # BLD AUTO: 0.06 10*3/MM3 (ref 0–0.2)
BASOPHILS NFR BLD AUTO: 0.3 % (ref 0–1.5)
BDY SITE: ABNORMAL
DEPRECATED RDW RBC AUTO: 45.6 FL (ref 37–54)
DIFFERENTIAL METHOD BLD: ABNORMAL
EOSINOPHIL # BLD AUTO: 0 10*3/MM3 (ref 0–0.4)
EOSINOPHIL NFR BLD AUTO: 0 % (ref 0.3–6.2)
ERYTHROCYTE [DISTWIDTH] IN BLOOD BY AUTOMATED COUNT: 13.4 % (ref 12.3–15.4)
HCO3 BLDCOA-SCNC: 26.3 MMOL/L (ref 22–28)
HCT VFR BLD AUTO: 37.2 % (ref 34–46.6)
HGB BLD-MCNC: 12.8 G/DL (ref 12–15.9)
IMM GRANULOCYTES # BLD AUTO: 0.17 10*3/MM3 (ref 0–0.05)
IMM GRANULOCYTES NFR BLD AUTO: 0.7 % (ref 0–0.5)
LYMPHOCYTES # BLD AUTO: 1.07 10*3/MM3 (ref 0.7–3.1)
LYMPHOCYTES NFR BLD AUTO: 4.5 % (ref 19.6–45.3)
MCH RBC QN AUTO: 31.9 PG (ref 26.6–33)
MCHC RBC AUTO-ENTMCNC: 34.4 G/DL (ref 31.5–35.7)
MCV RBC AUTO: 92.8 FL (ref 79–97)
MODALITY: ABNORMAL
MONOCYTES # BLD AUTO: 1.62 10*3/MM3 (ref 0.1–0.9)
MONOCYTES NFR BLD AUTO: 6.9 % (ref 5–12)
NEUTROPHILS # BLD AUTO: 20.6 10*3/MM3 (ref 1.7–7)
NEUTROPHILS NFR BLD AUTO: 87.6 % (ref 42.7–76)
NOTE: ABNORMAL
NRBC BLD AUTO-RTO: 0 /100 WBC (ref 0–0.2)
PCO2 BLDCOA: 61.9 MMHG (ref 43–63)
PH BLDCOA: 7.24 PH UNITS (ref 7.18–7.34)
PLATELET # BLD AUTO: 144 10*3/MM3 (ref 140–450)
PMV BLD AUTO: 12.8 FL (ref 6–12)
PO2 BLDCOA: 17.1 MMHG (ref 12–26)
RBC # BLD AUTO: 4.01 10*6/MM3 (ref 3.77–5.28)
SAO2 % BLDCOA: 17.5 % (ref 92–99)
WBC # BLD AUTO: 23.52 10*3/MM3 (ref 3.4–10.8)
WBC NRBC COR # BLD: 23.52 10*3/MM3 (ref 3.4–10.8)

## 2020-06-03 PROCEDURE — C1755 CATHETER, INTRASPINAL: HCPCS | Performed by: ANESTHESIOLOGY

## 2020-06-03 PROCEDURE — 25010000002 ROPIVACAINE PER 1 MG: Performed by: ANESTHESIOLOGY

## 2020-06-03 PROCEDURE — 82803 BLOOD GASES ANY COMBINATION: CPT

## 2020-06-03 PROCEDURE — 25010000002 FENTANYL CITRATE (PF) 2500 MCG/50ML SOLUTION: Performed by: ANESTHESIOLOGY

## 2020-06-03 PROCEDURE — 0KQM0ZZ REPAIR PERINEUM MUSCLE, OPEN APPROACH: ICD-10-PCS | Performed by: OBSTETRICS & GYNECOLOGY

## 2020-06-03 PROCEDURE — 85027 COMPLETE CBC AUTOMATED: CPT | Performed by: OBSTETRICS & GYNECOLOGY

## 2020-06-03 PROCEDURE — C1755 CATHETER, INTRASPINAL: HCPCS

## 2020-06-03 PROCEDURE — 25010000002 ONDANSETRON PER 1 MG: Performed by: ANESTHESIOLOGY

## 2020-06-03 RX ORDER — HYDROXYZINE 50 MG/1
50 TABLET, FILM COATED ORAL NIGHTLY PRN
Status: DISCONTINUED | OUTPATIENT
Start: 2020-06-03 | End: 2020-06-05 | Stop reason: HOSPADM

## 2020-06-03 RX ORDER — OXYTOCIN-SODIUM CHLORIDE 0.9% IV SOLN 30 UNIT/500ML 30-0.9/5 UT/ML-%
125 SOLUTION INTRAVENOUS CONTINUOUS PRN
Status: COMPLETED | OUTPATIENT
Start: 2020-06-03 | End: 2020-06-03

## 2020-06-03 RX ORDER — OXYTOCIN-SODIUM CHLORIDE 0.9% IV SOLN 30 UNIT/500ML 30-0.9/5 UT/ML-%
999 SOLUTION INTRAVENOUS ONCE
Status: DISCONTINUED | OUTPATIENT
Start: 2020-06-03 | End: 2020-06-05 | Stop reason: HOSPADM

## 2020-06-03 RX ORDER — MINERAL OIL
OIL (ML) MISCELLANEOUS ONCE AS NEEDED
Status: COMPLETED | OUTPATIENT
Start: 2020-06-03 | End: 2020-06-03

## 2020-06-03 RX ORDER — ONDANSETRON 2 MG/ML
4 INJECTION INTRAMUSCULAR; INTRAVENOUS EVERY 6 HOURS PRN
Status: DISCONTINUED | OUTPATIENT
Start: 2020-06-03 | End: 2020-06-05 | Stop reason: HOSPADM

## 2020-06-03 RX ORDER — PHYTONADIONE 1 MG/.5ML
INJECTION, EMULSION INTRAMUSCULAR; INTRAVENOUS; SUBCUTANEOUS
Status: DISPENSED
Start: 2020-06-03 | End: 2020-06-04

## 2020-06-03 RX ORDER — PROMETHAZINE HYDROCHLORIDE 25 MG/ML
12.5 INJECTION, SOLUTION INTRAMUSCULAR; INTRAVENOUS EVERY 6 HOURS PRN
Status: DISCONTINUED | OUTPATIENT
Start: 2020-06-03 | End: 2020-06-05 | Stop reason: HOSPADM

## 2020-06-03 RX ORDER — ERYTHROMYCIN 5 MG/G
OINTMENT OPHTHALMIC
Status: DISPENSED
Start: 2020-06-03 | End: 2020-06-04

## 2020-06-03 RX ORDER — BISACODYL 10 MG
10 SUPPOSITORY, RECTAL RECTAL DAILY PRN
Status: DISCONTINUED | OUTPATIENT
Start: 2020-06-04 | End: 2020-06-05 | Stop reason: HOSPADM

## 2020-06-03 RX ORDER — HYDROCORTISONE 25 MG/G
1 CREAM TOPICAL AS NEEDED
Status: DISCONTINUED | OUTPATIENT
Start: 2020-06-03 | End: 2020-06-05 | Stop reason: HOSPADM

## 2020-06-03 RX ORDER — CALCIUM CARBONATE 200(500)MG
2 TABLET,CHEWABLE ORAL 3 TIMES DAILY PRN
Status: DISCONTINUED | OUTPATIENT
Start: 2020-06-03 | End: 2020-06-05 | Stop reason: HOSPADM

## 2020-06-03 RX ORDER — OXYTOCIN-SODIUM CHLORIDE 0.9% IV SOLN 30 UNIT/500ML 30-0.9/5 UT/ML-%
250 SOLUTION INTRAVENOUS CONTINUOUS
Status: ACTIVE | OUTPATIENT
Start: 2020-06-03 | End: 2020-06-03

## 2020-06-03 RX ORDER — METHYLERGONOVINE MALEATE 0.2 MG/ML
200 INJECTION INTRAVENOUS ONCE AS NEEDED
Status: DISCONTINUED | OUTPATIENT
Start: 2020-06-03 | End: 2020-06-03 | Stop reason: HOSPADM

## 2020-06-03 RX ORDER — OXYTOCIN-SODIUM CHLORIDE 0.9% IV SOLN 30 UNIT/500ML 30-0.9/5 UT/ML-%
1-20 SOLUTION INTRAVENOUS
Status: DISCONTINUED | OUTPATIENT
Start: 2020-06-03 | End: 2020-06-03

## 2020-06-03 RX ORDER — PROMETHAZINE HYDROCHLORIDE 25 MG/1
12.5 SUPPOSITORY RECTAL EVERY 6 HOURS PRN
Status: DISCONTINUED | OUTPATIENT
Start: 2020-06-03 | End: 2020-06-05 | Stop reason: HOSPADM

## 2020-06-03 RX ORDER — ONDANSETRON 4 MG/1
4 TABLET, FILM COATED ORAL EVERY 8 HOURS PRN
Status: DISCONTINUED | OUTPATIENT
Start: 2020-06-03 | End: 2020-06-05 | Stop reason: HOSPADM

## 2020-06-03 RX ORDER — OXYCODONE HYDROCHLORIDE AND ACETAMINOPHEN 5; 325 MG/1; MG/1
1 TABLET ORAL EVERY 4 HOURS PRN
Status: DISCONTINUED | OUTPATIENT
Start: 2020-06-03 | End: 2020-06-05 | Stop reason: HOSPADM

## 2020-06-03 RX ORDER — MISOPROSTOL 200 UG/1
800 TABLET ORAL AS NEEDED
Status: DISCONTINUED | OUTPATIENT
Start: 2020-06-03 | End: 2020-06-03 | Stop reason: HOSPADM

## 2020-06-03 RX ORDER — MISOPROSTOL 200 UG/1
600 TABLET ORAL ONCE AS NEEDED
Status: DISCONTINUED | OUTPATIENT
Start: 2020-06-03 | End: 2020-06-05 | Stop reason: HOSPADM

## 2020-06-03 RX ORDER — CARBOPROST TROMETHAMINE 250 UG/ML
250 INJECTION, SOLUTION INTRAMUSCULAR AS NEEDED
Status: DISCONTINUED | OUTPATIENT
Start: 2020-06-03 | End: 2020-06-03 | Stop reason: HOSPADM

## 2020-06-03 RX ORDER — LANOLIN
CREAM (ML) TOPICAL
Status: DISCONTINUED | OUTPATIENT
Start: 2020-06-03 | End: 2020-06-05 | Stop reason: HOSPADM

## 2020-06-03 RX ORDER — OXYTOCIN-SODIUM CHLORIDE 0.9% IV SOLN 30 UNIT/500ML 30-0.9/5 UT/ML-%
999 SOLUTION INTRAVENOUS ONCE
Status: COMPLETED | OUTPATIENT
Start: 2020-06-03 | End: 2020-06-03

## 2020-06-03 RX ORDER — OXYCODONE AND ACETAMINOPHEN 10; 325 MG/1; MG/1
1 TABLET ORAL EVERY 4 HOURS PRN
Status: DISCONTINUED | OUTPATIENT
Start: 2020-06-03 | End: 2020-06-05 | Stop reason: HOSPADM

## 2020-06-03 RX ORDER — PROMETHAZINE HYDROCHLORIDE 25 MG/1
25 TABLET ORAL EVERY 6 HOURS PRN
Status: DISCONTINUED | OUTPATIENT
Start: 2020-06-03 | End: 2020-06-05 | Stop reason: HOSPADM

## 2020-06-03 RX ORDER — IBUPROFEN 800 MG/1
800 TABLET ORAL EVERY 8 HOURS PRN
Status: DISCONTINUED | OUTPATIENT
Start: 2020-06-03 | End: 2020-06-05 | Stop reason: HOSPADM

## 2020-06-03 RX ORDER — DOCUSATE SODIUM 100 MG/1
100 CAPSULE, LIQUID FILLED ORAL 2 TIMES DAILY
Status: DISCONTINUED | OUTPATIENT
Start: 2020-06-03 | End: 2020-06-05 | Stop reason: HOSPADM

## 2020-06-03 RX ADMIN — OXYCODONE HYDROCHLORIDE AND ACETAMINOPHEN 1 TABLET: 10; 325 TABLET ORAL at 19:44

## 2020-06-03 RX ADMIN — Medication: at 19:09

## 2020-06-03 RX ADMIN — OXYTOCIN 125 ML/HR: 10 INJECTION, SOLUTION INTRAMUSCULAR; INTRAVENOUS at 15:50

## 2020-06-03 RX ADMIN — SODIUM CHLORIDE, POTASSIUM CHLORIDE, SODIUM LACTATE AND CALCIUM CHLORIDE 125 ML/HR: 600; 310; 30; 20 INJECTION, SOLUTION INTRAVENOUS at 10:25

## 2020-06-03 RX ADMIN — IBUPROFEN 800 MG: 800 TABLET ORAL at 18:17

## 2020-06-03 RX ADMIN — ROPIVACAINE HYDROCHLORIDE 9 ML/HR: 2 INJECTION, SOLUTION EPIDURAL; INFILTRATION at 14:29

## 2020-06-03 RX ADMIN — OXYTOCIN 999 ML/HR: 10 INJECTION INTRAVENOUS at 14:55

## 2020-06-03 RX ADMIN — DOCUSATE SODIUM 100 MG: 100 CAPSULE, LIQUID FILLED ORAL at 19:44

## 2020-06-03 RX ADMIN — OXYTOCIN 125 ML/HR: 10 INJECTION INTRAVENOUS at 17:35

## 2020-06-03 RX ADMIN — OXYTOCIN 2 MILLI-UNITS/MIN: 10 INJECTION INTRAVENOUS at 02:46

## 2020-06-03 RX ADMIN — SODIUM CHLORIDE, POTASSIUM CHLORIDE, SODIUM LACTATE AND CALCIUM CHLORIDE 999 ML/HR: 600; 310; 30; 20 INJECTION, SOLUTION INTRAVENOUS at 06:44

## 2020-06-03 RX ADMIN — Medication: at 14:50

## 2020-06-03 RX ADMIN — ROPIVACAINE HYDROCHLORIDE 10 ML/HR: 2 INJECTION, SOLUTION EPIDURAL; INFILTRATION at 06:58

## 2020-06-03 RX ADMIN — SODIUM CHLORIDE, POTASSIUM CHLORIDE, SODIUM LACTATE AND CALCIUM CHLORIDE 125 ML/HR: 600; 310; 30; 20 INJECTION, SOLUTION INTRAVENOUS at 02:50

## 2020-06-03 RX ADMIN — ONDANSETRON 4 MG: 2 INJECTION INTRAMUSCULAR; INTRAVENOUS at 15:18

## 2020-06-03 RX ADMIN — FAMOTIDINE 20 MG: 10 INJECTION, SOLUTION INTRAVENOUS at 10:58

## 2020-06-03 NOTE — PLAN OF CARE
Problem: Patient Care Overview  Goal: Plan of Care Review  Outcome: Ongoing (interventions implemented as appropriate)  Flowsheets  Taken 6/3/2020 0519  Progress: improving  Outcome Summary: Pt admitted for term cervical ripening with cervidil r/t increase BP in office. Cervidil out at 0120 and pitocin started. VSS. Will continue to monitor.  Taken 6/2/2020 1930  Plan of Care Reviewed With: patient;spouse  Goal: Individualization and Mutuality  Outcome: Ongoing (interventions implemented as appropriate)  Flowsheets (Taken 6/3/2020 0519)  Patient Specific Goals (Include Timeframe): pain management  How to Address Anxieties/Fears: explain POC, answer questions  What Information Would Help Us Give You More Personalized Care?: FOB at bedside at all time, fob to cut cord  How Would You and/or Your Support Person Like to Participate in Your Care?: explain POC, answer questions  What Anxieties, Fears, Concerns, or Questions Do You Have About Your Care?: first baby, previous sab/d&c  Patient Specific Preferences: epidural, KIMANI, breastfeed  Goal: Discharge Needs Assessment  Outcome: Ongoing (interventions implemented as appropriate)  Flowsheets  Taken 6/3/2020 0519 by Felicia Keller, RN  Equipment Needed After Discharge: none  Anticipated Changes Related to Illness: none  Transportation Concerns: car, none  Taken 6/2/2020 1044 by Alissa Farias, RN  Equipment Currently Used at Home: none  Taken 6/2/2020 1042 by Alissa Farias, RN  Transportation Anticipated: car, drives self  Concerns to be Addressed: no discharge needs identified  Readmission Within the Last 30 Days: no previous admission in last 30 days  Patient/Family Anticipated Services at Transition: none  Patient/Family Anticipates Transition to: home with family  Goal: Interprofessional Rounds/Family Conf  Outcome: Ongoing (interventions implemented as appropriate)  Flowsheets (Taken 6/3/2020 0519)  Participants: nursing; patient; physician; family     Problem: Labor  (Cervical Ripen, Induct, Augment) (Adult,Obstetrics,Pediatric)  Goal: Signs and Symptoms of Listed Potential Problems Will be Absent, Minimized or Managed (Labor)  Outcome: Ongoing (interventions implemented as appropriate)  Flowsheets (Taken 6/3/2020 8925)  Problems Assessed (Labor): all  Problems Present (Labor): none

## 2020-06-03 NOTE — ANESTHESIA PROCEDURE NOTES
Labor Epidural      Patient reassessed immediately prior to procedure    Patient location during procedure: OB  Start Time: 6/3/2020 6:38 AM  Stop Time: 6/3/2020 6:57 AM  Indication:at surgeon's request  Performed By  Anesthesiologist: Luis Antonio Harp MD  Preanesthetic Checklist  Completed: patient identified, site marked, surgical consent, pre-op evaluation, timeout performed, IV checked, risks and benefits discussed and monitors and equipment checked  Prep:  Pt Position:sitting  Sterile Tech:cap, mask, sterile barrier and gloves  Prep:chlorhexidine gluconate and isopropyl alcohol  Monitoring:blood pressure monitoring, continuous pulse oximetry and EKG  Epidural Block Procedure:  Approach:midline  Guidance:landmark technique and palpation technique  Location:L3-L4  Needle Type:Tuohy  Needle Gauge:17 G  Loss of Resistance Medium: saline  Loss of Resistance: 6cm  Cath Depth at skin:11 cm  Paresthesia: none  Aspiration:negative  Test Dose:negative  Number of Attempts: 2  Post Assessment:  Dressing:secured with tape and occlusive dressing applied  Pt Tolerance:patient tolerated the procedure well with no apparent complications  Complications:no

## 2020-06-03 NOTE — L&D DELIVERY NOTE
[facility]  Vaginal Delivery Note    Delivery    Xochilt Oreilly 30 y.o.  at 39w1d    Induction: Dinoprostone Insert;Oxytocin;Amniotomy   Induction indication:     Cervical ripenin2020  1:19 PM   Augmentation:     Labor onset:6/3/2020  8:35 AM   Dilation complete: 6/3/2020  12:40 PM   Beginning of second stage: 6/3/2020  2:06 PM     Antibiotics received during labor: No            Delivery: Vaginal, Spontaneous     YOB: 2021    Time of Birth: 2:53 PM      Anesthesia: Epidural     Delivering clinician: Tabatha Telles MD       Infant    Findings: Viable female  infant    Infant observations: Weight: 3294 g (7 lb 4.2 oz)    Observations/Comments:  scale 1      Apgars: 8   @ 1 minute /    9   @ 5 minutes     Placenta, Cord, and Fluid    Placenta delivered  Spontaneous   at  6/3/2020  2:58 PM     Cord: 3 vessels  present.   Cord blood obtained: Yes    Cord gases obtained:  Yes      Repair    Episiotomy: none   Lacerations: 2nd   Estimated Blood Loss: 300  mls.       Delivery narrative: The patient is a 30 y.o.  at 39w1d.  Presented from office for induction due to gest HTN.  bp stable in labor. S/p cervidil overnight then pit.  Membrane rupture/fluid: artificial rupture of membranes;Intact  at 8:35 AM  on 6/3/2020  Clear  She progressed appropriately in labor. FHR were overall reassuring without persistent worrisome decelerations.  SheProgressed to complete at 12:40 PM . Labored down until 6/3/2020  2:06 PM . She pushed about 45 minutes and had a   of a  3294 g (7 lb 4.2 oz)  female   infant   8   @ 1 minute /   9   @ 5 minutes. Nuchal x1 - reduced. The left shoulder was the anterior shoulder and easily delivered. Arterial was pH sent.    Placenta was spontaneously delivered,3 vessel cord, intact. . Cervix and rectum intact. There was a  2nd degree laceration repaired in usual fashion with vicryl suture. EBL was 300  mls. There were no complications. Mother and baby doing well at time  of dictation.          Pregnancy      Tabatha Telles MD  06/03/20  22:44    Delivery note completed now- 4:03 PM  too soon after delivery that nursing info not yet entered. Refreshing later so missing delivery demographics recorded.

## 2020-06-03 NOTE — PROGRESS NOTES
Cervidil/pit induction due to inc bp    Cervidil overnight. On pit. Comfortable with epidural      bp stable- no meds needed.      fth- cat 1    cvx- 4-5/90/-2. AROM- clear. iupc placed.    toco- q 2-3 min. Pit at 12 mu      Getting in to active labor now.    bp stable

## 2020-06-03 NOTE — PLAN OF CARE
Problem: Patient Care Overview  Goal: Plan of Care Review  Outcome: Ongoing (interventions implemented as appropriate)  Flowsheets  Taken 6/3/2020 1511  Progress: improving  Outcome Summary: Pt resting after vaginal delviery. Denies pain. Will transport to mother baby at end of recovery  Taken 6/3/2020 0715  Plan of Care Reviewed With: patient;spouse     Problem: Patient Care Overview  Goal: Individualization and Mutuality  6/3/2020 1511 by Lyudmila Kessler RN  Outcome: Ongoing (interventions implemented as appropriate)  Flowsheets  Taken 6/3/2020 0959 by Lyudmila Kessler RN  Patient Specific Goals (Include Timeframe): healthy mom and baby at time of delivery  How to Address Anxieties/Fears: care explained, questions encouarged and answered  Patient Specific Interventions: epidural for pain management  How Would You and/or Your Support Person Like to Participate in Your Care?: FOB to be at bedside during delivery and involved in POC  Taken 6/3/2020 0519 by Felicia Keller RN  What Information Would Help Us Give You More Personalized Care?: FOB at bedside at all time, fob to cut cord  What Anxieties, Fears, Concerns, or Questions Do You Have About Your Care?: first baby, previous sab/d&c  Patient Specific Preferences: epidural, KIMANI, breastfeed     Problem: Patient Care Overview  Goal: Discharge Needs Assessment  Outcome: Ongoing (interventions implemented as appropriate)     Problem: Patient Care Overview  Goal: Interprofessional Rounds/Family Conf  Outcome: Ongoing (interventions implemented as appropriate)  Flowsheets (Taken 6/3/2020 1511)  Participants: nursing; physician; patient     Problem: Labor (Cervical Ripen, Induct, Augment) (Adult,Obstetrics,Pediatric)  Goal: Signs and Symptoms of Listed Potential Problems Will be Absent, Minimized or Managed (Labor)  Outcome: Ongoing (interventions implemented as appropriate)  Flowsheets (Taken 6/3/2020 0519 by Felicia Keller RN)  Problems Assessed (Labor): all  Problems  Present (Labor): none     Problem: Anesthesia/Analgesia, Neuraxial (Adult)  Goal: Signs and Symptoms of Listed Potential Problems Will be Absent, Minimized or Managed (Anesthesia/Analgesia, Neuraxial)  Outcome: Ongoing (interventions implemented as appropriate)  Flowsheets (Taken 6/3/2020 1511)  Problems Assessed (Neuraxial Anesthesia/Analgesia): all  Problems Present (Neuraxial Anes/Analg): none     Problem: Fall Risk,  (Adult,Obstetrics,Pediatric)  Goal: Identify Related Risk Factors and Signs and Symptoms  Outcome: Ongoing (interventions implemented as appropriate)  Flowsheets (Taken 6/3/2020 1511)  Related Risk Factors (Fall Risk, ): regional anesthesia  Signs and Symptoms (Fall Risk, ): presence of fall risk factors     Problem: Fall Risk,  (Adult,Obstetrics,Pediatric)  Goal: Absence of Maternal Fall  Outcome: Ongoing (interventions implemented as appropriate)  Flowsheets (Taken 6/3/2020 1511)  Absence of Maternal Fall: making progress toward outcome     Problem: Fall Risk,  (Adult,Obstetrics,Pediatric)  Goal: Absence of Viper Fall/Drop  Outcome: Ongoing (interventions implemented as appropriate)  Flowsheets (Taken 6/3/2020 1511)  Absence of Viper Fall/Drop: making progress toward outcome     Problem: Skin Injury Risk (Adult)  Goal: Identify Related Risk Factors and Signs and Symptoms  Outcome: Ongoing (interventions implemented as appropriate)  Flowsheets (Taken 6/3/2020 151)  Related Risk Factors (Skin Injury Risk): medication; mobility impaired; moisture     Problem: Skin Injury Risk (Adult)  Goal: Skin Health and Integrity  Outcome: Ongoing (interventions implemented as appropriate)  Flowsheets (Taken 6/3/2020 1511)  Skin Health and Integrity: making progress toward outcome     Problem: Breastfeeding (Adult,Obstetrics,Pediatric)  Goal: Signs and Symptoms of Listed Potential Problems Will be Absent, Minimized or Managed (Breastfeeding)  Outcome: Ongoing  (interventions implemented as appropriate)  Flowsheets (Taken 6/3/2020 1511)  Problems Assessed (Breastfeeding): all  Problems Present (Breastfeeding): none

## 2020-06-04 PROBLEM — O13.9 GESTATIONAL HYPERTENSION: Status: ACTIVE | Noted: 2020-06-04

## 2020-06-04 LAB
BASOPHILS # BLD AUTO: 0.08 10*3/MM3 (ref 0–0.2)
BASOPHILS NFR BLD AUTO: 0.4 % (ref 0–1.5)
DEPRECATED RDW RBC AUTO: 44.9 FL (ref 37–54)
EOSINOPHIL # BLD AUTO: 0.17 10*3/MM3 (ref 0–0.4)
EOSINOPHIL NFR BLD AUTO: 0.9 % (ref 0.3–6.2)
ERYTHROCYTE [DISTWIDTH] IN BLOOD BY AUTOMATED COUNT: 13.3 % (ref 12.3–15.4)
HCT VFR BLD AUTO: 30.4 % (ref 34–46.6)
HGB BLD-MCNC: 10.8 G/DL (ref 12–15.9)
IMM GRANULOCYTES # BLD AUTO: 0.14 10*3/MM3 (ref 0–0.05)
IMM GRANULOCYTES NFR BLD AUTO: 0.7 % (ref 0–0.5)
LYMPHOCYTES # BLD AUTO: 2.84 10*3/MM3 (ref 0.7–3.1)
LYMPHOCYTES NFR BLD AUTO: 14.6 % (ref 19.6–45.3)
MCH RBC QN AUTO: 32.8 PG (ref 26.6–33)
MCHC RBC AUTO-ENTMCNC: 35.5 G/DL (ref 31.5–35.7)
MCV RBC AUTO: 92.4 FL (ref 79–97)
MONOCYTES # BLD AUTO: 1.21 10*3/MM3 (ref 0.1–0.9)
MONOCYTES NFR BLD AUTO: 6.2 % (ref 5–12)
NEUTROPHILS # BLD AUTO: 15.04 10*3/MM3 (ref 1.7–7)
NEUTROPHILS NFR BLD AUTO: 77.2 % (ref 42.7–76)
NRBC BLD AUTO-RTO: 0 /100 WBC (ref 0–0.2)
PLATELET # BLD AUTO: 136 10*3/MM3 (ref 140–450)
PMV BLD AUTO: 13 FL (ref 6–12)
RBC # BLD AUTO: 3.29 10*6/MM3 (ref 3.77–5.28)
WBC NRBC COR # BLD: 19.48 10*3/MM3 (ref 3.4–10.8)

## 2020-06-04 PROCEDURE — 85025 COMPLETE CBC W/AUTO DIFF WBC: CPT | Performed by: OBSTETRICS & GYNECOLOGY

## 2020-06-04 RX ADMIN — IBUPROFEN 800 MG: 800 TABLET ORAL at 08:17

## 2020-06-04 RX ADMIN — OXYCODONE HYDROCHLORIDE AND ACETAMINOPHEN 1 TABLET: 5; 325 TABLET ORAL at 08:17

## 2020-06-04 RX ADMIN — DOCUSATE SODIUM 100 MG: 100 CAPSULE, LIQUID FILLED ORAL at 23:57

## 2020-06-04 RX ADMIN — IBUPROFEN 800 MG: 800 TABLET ORAL at 18:49

## 2020-06-04 RX ADMIN — DOCUSATE SODIUM 100 MG: 100 CAPSULE, LIQUID FILLED ORAL at 08:17

## 2020-06-04 NOTE — PROGRESS NOTES
[facility]  Vaginal Delivery Progress Note    Patient Name: Xochilt Oreilly  :  1990  MRN:  [id number]      Subjective   Postpartum Day 1: Vaginal Delivery of a female infant.     The patient feels well without complaints.  Her pain is well controlled.  Reports normal lochia.     The patient plans to breastfeed.    Objective     Vital Signs Range for the last 24 hours  Temperature: Temp:  [97.6 °F (36.4 °C)-99.5 °F (37.5 °C)] 98.5 °F (36.9 °C)       BP: BP: (111-151)/() 124/81   Pulse: Heart Rate:  [] 84   Respirations: Resp:  [16-18] 16                       Physical Exam:  General: Awake and alert  Abdomen: Fundus: firm, non tender, 1 below umbilicus  Extremities:  trace edema, NT     Labs:     Results from last 7 days   Lab Units 20  0641 20  1711 20  1020   WBC 10*3/mm3 19.48* 23.52*  23.52* 11.81*   HEMOGLOBIN g/dL 10.8* 12.8 14.0   HEMATOCRIT % 30.4* 37.2 40.1   PLATELETS 10*3/mm3 136* 144 148       Prenatal labs results reviewed:  Yes   Rubella:  Immune  Rh Status:    RH type   Date Value Ref Range Status   2020 Positive  Final         Assessment/Plan  : 1. PPD1 S/P  - Doing well, continue usual cares.     GHTN-- BP stable    Had elevated HR after delivery.  Has stabilized now            Pregnancy    Gestational hypertension          Caitlyn Garcia, JANINA  2020  12:30

## 2020-06-04 NOTE — PROGRESS NOTES
RN notified of inc HR after delivery.    Running 120s. Good urine output. bp stable. 02sat good. Nl lochia.      Pt says no cp. No sob. Feels fine.    Hr 120-110 other vss    Reg rhythm but hr 115 range  CTAB    Results from last 7 days   Lab Units 06/03/20  1711 06/02/20  1020   WBC 10*3/mm3 23.52*  23.52* 11.81*   HEMOGLOBIN g/dL 12.8 14.0   HEMATOCRIT % 37.2 40.1   PLATELETS 10*3/mm3 144 148       Gave IV bolus 500cc.    Clinically looks fine. No evidence of bleeding or infection.    Ok to go to floor

## 2020-06-04 NOTE — LACTATION NOTE
P1T. Patient called for help with baby who has been S2S for hours and still not nursing. Mom expresses colostrum easily but baby will only nibble at breast then smacks loudly on her hands . Started her medela Pump n' Style and obtained 4 cc colostrum that was syringe fed to infant. Nipple remained very everted for some time after and we tried again to latch Miss Zhao but she only held the nipple in her mouth and dozed. FOB helpful and supportive . Instructed on use and cleaning of pump and syringe feeding EBM. Baby sucks well on a gloved finger but does some tongue thrusting. Baby has a slight cough when crying hard. Baby is pink with pink nalibeds and mucous membranes . [facility] notified.   Lactation Consult Note    Evaluation Completed: 2020 16:13  Patient Name: Xochilt Oreilly  :  1990  MRN:  [id number]     REFERRAL  INFORMATION:                          Date of Referral: 20   Person Making Referral: nurse, patient  Maternal Reason for Referral: breastfeeding currently       DELIVERY HISTORY:          Skin to skin initiation date/time: 6/3/2020  2:54 PM   Skin to skin end date/time:              MATERNAL ASSESSMENT:  Breast Size Issue: none (20 1500 : Summer Campos RN)  Breast Shape: round (20 1500 : Summer Campos, RN)  Breast Density: other (see comments)(dense) (20 1500 : Summer Campos, RN)  Areola: dense (20 1500 : Summer Campos, RN)  Nipples: everted, other (see comments)(very everted after pumping) (20 1500 : Summer Campos, RN)                INFANT ASSESSMENT:  Information for the patient's :  Shaylee Oreilly [[id number]]   No past medical history on file.    Feeding Readiness Cues: hand to mouth movements, nonnutritive sucking (20 1600 : Summer Campos, RN)   Feeding Method: breastfeeding (20 1600 : Summer Campos, RN)   Feeding Tolerance/Success: arousal required, suck inconsistent, sleepy,  uncoordinated suck (20 1600 : Summer Campos, RN)                       Nutrition Interventions: lactation consult initiated (20 1600 : Smumer Campos RN)   Additional Documentation: LATCH Score (Group) (20 1600 : Summer Campos RN)           Breastfeeding: breastfeeding, bilateral (20 1600 : Summer Campos, RN)   Infant Positioning: clutch/football, cradle, laid back (ventral) (20 1600 : Summer Campos, RN)           Effective Latch During Feeding: no (20 1600 : Summer Campos, RN)   Suck/Swallow Coordination: other (see comments)(inconsistent) (20 1600 : Summer Campos RN)   Signs of Milk Transfer: suck/swallow ratio, other (see comments)(syringe fed) (20 1600 : Summer Campos RN)       Latch: 1-->repeated attempts, holds nipple in mouth, stimulate to suck (20 1600 : Summer Campos, RN)   Audible Swallowin-->none (20 1600 : Summer Campos, RN)   Type of Nipple: 2-->everted (after stimulation) (20 1600 : Summer Campos, RN)   Comfort (Breast/Nipple): 2-->soft/nontender (20 1600 : Summer Campos, RN)   Hold (Positioning): 0-->full assist (staff holds infant at breast) (20 1600 : Summer Campos, RN)   Latch Score: 5 (20 1600 : Summer Campos RN)     Infant-Driven Feeding Scales - Readiness: Alert once handled. Some rooting or takes pacifier. Adequate tone. (20 1600 : Summer Campos RN)                 MATERNAL INFANT FEEDING:  Maternal Preparation: breast care, hand hygiene (20 1500 : Summer Campos RN)  Maternal Emotional State: assist needed (20 1500 : Summer Campos, RN)  Infant Positioning: cradle, laid back (ventral), clutch/football (20 1500 : Summer Campos, RN)   Signs of Milk Transfer: infant jaw motion present, suck/swallow ratio (20 1500 : Summer Campos, RN)  Pain with Feeding: no  (06/04/20 1500 : Summer Campos, RN)  Pain Location: nipple, left (06/04/20 0900 : Summer Campos, RN)  Pain Description: soreness (06/04/20 0900 : Summer Campos, RN)  Comfort Measures Before/During Feeding: infant position adjusted, latch adjusted, suction broken using finger (06/04/20 1500 : Summer Campos, RN)  Milk Ejection Reflex: present (06/04/20 1500 : Summer Campos RN)           Latch Assistance: yes (06/04/20 1500 : Summer Campos, RN)                               EQUIPMENT TYPE:  Breast Pump Type: double electric, hospital grade (06/04/20 1500 : Summer Campos RN)  Breast Pump Flange Type: hard (06/04/20 1500 : Summer Campos RN)  Breast Pump Flange Size: 24 mm (06/04/20 1500 : Summer Campos RN)    Breast Care: Breastfeeding: breast milk to nipples, lanolin to nipples, manual expression to soften breast, milk massaged towards nipple, nipple shield utilized, open to air (06/04/20 1500 : Summer Campos RN)  Breastfeeding Assistance: assisted with positioning, electric breast pump used, feeding cue recognition promoted, hand expression, infant latch-on verified, infant stimulated to wakeful state, infant suck/swallow verified, nipple shield utilized, support offered (06/04/20 1500 : Summer Campos RN)     Breastfeeding Support: diary/feeding log utilized, encouragement provided, infant-mother separation minimized, lactation counseling provided, maternal hydration promoted, maternal rest encouraged (06/04/20 1500 : Summer Campos, RN)          BREAST PUMPING:  Breast Pumping Interventions: post-feed pumping encouraged (06/04/20 1500 : Summer Campos RN)  Breast Pumping: bilateral breasts pumped until soft, double electric breast pump utilized, pre-pumping breast massage (06/04/20 1500 : Summer Campos, RN)    LACTATION REFERRALS:  Lactation Referrals: outpatient lactation program (06/04/20 1500 : Summer Campos RN)

## 2020-06-04 NOTE — LACTATION NOTE
P1. Patient is worried that baby is not feeding well . Baby is sleepy but rouses easily for a few deep sucks then drifts back to sleep. Assisted with positioning and hand expression. Colostrum expresses well bilaterally. Baby nursed well x 20 mins earlier in the night and nursed some at birth. Encouraged S2S and patience. Will call when ready for pump demo. Suggested watching the hand expression video.   Lactation Consult Note    Evaluation Completed: 2020 09:15  Patient Name: Xochilt Oreilly  :  1990  MRN:  [id number]     REFERRAL  INFORMATION:                          Date of Referral: 20   Person Making Referral: nurse  Maternal Reason for Referral: breastfeeding currently       DELIVERY HISTORY:          Skin to skin initiation date/time: 6/3/2020  2:54 PM   Skin to skin end date/time:              MATERNAL ASSESSMENT:  Breast Size Issue: none (20 : Summer Campos RN)  Breast Shape: round (20 : Summer Campos RN)  Breast Density: other (see comments)(dense) (20 : Summer Campos RN)  Areola: dense (20 : Summer Campos, RN)  Nipples: graspable (20 : Summer Campos, RN)                INFANT ASSESSMENT:  Information for the patient's :  Shaylee Oreilly [[id number]]   No past medical history on file.                                                                                                                                MATERNAL INFANT FEEDING:  Maternal Preparation: breast care, hand hygiene (20 : Summer Campos RN)  Maternal Emotional State: assist needed (20 : Summer Campos, RN)  Infant Positioning: laid back (ventral), cradle (20 : Summer Campos, RN)   Signs of Milk Transfer: infant jaw motion present, suck/swallow ratio (20 : Summer Campos, RN)  Pain with Feeding: yes (20 : Summer Campos, RN)  Pain Location:  nipple, left (06/04/20 0900 : Summer Campos, RN)  Pain Description: soreness (06/04/20 0900 : Summer Campos, RN)  Comfort Measures Before/During Feeding: latch adjusted, infant position adjusted (06/04/20 0900 : Summer Campos, RN)  Milk Ejection Reflex: present (06/04/20 0900 : Summer Campos RN)           Latch Assistance: yes (06/04/20 0900 : Summer Campos, RN)                               EQUIPMENT TYPE:  Breast Pump Type: double electric, personal (06/04/20 0900 : Summer Campos, RN)  Breast Pump Flange Type: hard (06/04/20 0900 : Summer Campos RN)  Breast Pump Flange Size: 24 mm (06/04/20 0900 : Summer Campos RN)    Breast Care: Breastfeeding: breast milk to nipples, manual expression to soften breast, milk massaged towards nipple, open to air (06/04/20 0900 : Summer Campos, RN)  Breastfeeding Assistance: assisted with positioning, feeding cue recognition promoted, feeding session observed, hand expression, infant latch-on verified, infant suck/swallow verified, infant stimulated to wakeful state (06/04/20 0900 : Summer Campos, RN)     Breastfeeding Support: diary/feeding log utilized, encouragement provided, lactation counseling provided (06/04/20 0900 : Summer Campos, RN)          BREAST PUMPING:          LACTATION REFERRALS:

## 2020-06-05 VITALS
WEIGHT: 189 LBS | RESPIRATION RATE: 16 BRPM | OXYGEN SATURATION: 97 % | DIASTOLIC BLOOD PRESSURE: 81 MMHG | BODY MASS INDEX: 30.37 KG/M2 | SYSTOLIC BLOOD PRESSURE: 127 MMHG | HEIGHT: 66 IN | HEART RATE: 94 BPM | TEMPERATURE: 98.4 F

## 2020-06-05 RX ORDER — PSEUDOEPHEDRINE HCL 30 MG
100 TABLET ORAL 2 TIMES DAILY
Qty: 60 EACH | Refills: 0 | Status: SHIPPED | OUTPATIENT
Start: 2020-06-05 | End: 2020-11-13

## 2020-06-05 RX ORDER — IBUPROFEN 800 MG/1
800 TABLET ORAL EVERY 8 HOURS PRN
Qty: 60 TABLET | Refills: 0 | Status: SHIPPED | OUTPATIENT
Start: 2020-06-05 | End: 2020-11-13

## 2020-06-05 RX ORDER — OXYCODONE HYDROCHLORIDE AND ACETAMINOPHEN 5; 325 MG/1; MG/1
2 TABLET ORAL EVERY 4 HOURS PRN
Qty: 20 TABLET | Refills: 0 | Status: SHIPPED | OUTPATIENT
Start: 2020-06-05 | End: 2020-06-07

## 2020-06-05 RX ADMIN — BETAMETHASONE VALERATE: 1.2 OINTMENT TOPICAL at 12:09

## 2020-06-05 RX ADMIN — IBUPROFEN 800 MG: 800 TABLET ORAL at 03:52

## 2020-06-05 RX ADMIN — Medication: at 03:47

## 2020-06-05 RX ADMIN — DOCUSATE SODIUM 100 MG: 100 CAPSULE, LIQUID FILLED ORAL at 09:14

## 2020-06-05 NOTE — LACTATION NOTE
Lactation Consult Note  P1 term baby. Mom's been having difficulty latching and her right nipple is cracked. RN ordered APNO. We tried latching but baby is sucking her tongue and only latches to the nipple causing Mom pain. After 20 minutes of unsuccessful attempts and baby crying sucking her fist parents requested formula. Mo upset and crying. Helped Mom pump and also fed EBM. Encouraged pumping every 2-3 hrs (8-12 times a day) if baby is not latching well, feeding all ebm after pumping and supplement with formula. Discussed [facility] or zoom appointment. They really want D/C today.      Evaluation Completed: 2020 10:30  Patient Name: Xochilt Oreilly  :  1990  MRN:  [id number]     REFERRAL  INFORMATION:                          Date of Referral: 20   Person Making Referral: patient  Maternal Reason for Referral: breastfeeding currently       DELIVERY HISTORY:          Skin to skin initiation date/time: 6/3/2020  2:54 PM   Skin to skin end date/time:              MATERNAL ASSESSMENT:  Breast Size Issue: none (20 1000 : Maren Huang RN)  Breast Shape: round (20 1000 : Maren Huang RN)  Breast Density: soft (20 1000 : Maren Huang RN)  Areola: elastic (20 1000 : Maren Huang RN)  Nipples: everted (20 1000 : Maren Huang RN)     Left Nipple Symptoms: painful, redness (20 1000 : Maren Huang RN)  Right Nipple Symptoms: bleeding, painful, cracked (20 1000 : Maren Huang RN)       INFANT ASSESSMENT:  Information for the patient's :  Shaylee Oreilly [[id number]]   No past medical history on file.    Feeding Readiness Cues: eager (2030 : Maren Huang RN)   Feeding Method: breastfeeding (20 0930 : Maren Huang RN)                       Feeding Interventions: latch assistance provided (20 : Maren Huang RN)       Additional Documentation: SILVIA  Score (Group) (20 : Maren Huang RN)           Breastfeeding: breastfeeding, bilateral (20 : Maren Huang RN)   Infant Positioning: cross-cradle, clutch/football (20 : Maren Huang RN)   Breastfeeding Time, Left (min): (few sucks) (20 : Maren Huang RN)   Breastfeeding Time, Right (min): (few sucks) (20 : Maren Huang RN)   Effective Latch During Feeding: no (20 : Maren Huang RN)   Suck/Swallow Coordination: absent (20 : Maren Huang RN)   Signs of Milk Transfer: infant jaw motion present (20 : Maren Huang RN)       Latch: 1-->repeated attempts, holds nipple in mouth, stimulate to suck (20 : Maren Huang RN)   Audible Swallowin-->none (20 : Maren Huang RN)   Type of Nipple: 2-->everted (after stimulation) (20 : Maren Huang RN)   Comfort (Breast/Nipple): 0-->engorged, cracked, bleeding, large blisters or bruises (20 : Maren Huang RN)   Hold (Positioning): 1-->minimal assist, teach one side, mother does other, staff holds (20 : Maren Huang RN)   Latch Score: 4 (20 : Maren Huang RN)     Infant-Driven Feeding Scales - Readiness: Alert or fussy prior to care. Rooting and/or hands to mouth behavior. Good tone. (20 : Maren Huang RN)                 MATERNAL INFANT FEEDING:  Maternal Preparation: breast care (20 1000 : Maren Huang RN)  Maternal Emotional State: assist needed (20 1000 : Maren Huang RN)  Infant Positioning: clutch/football, cross-cradle (20 1000 : Maren Huang RN)   Signs of Milk Transfer: infant jaw motion present (20 1000 : Maren Huang RN)  Pain with Feeding: yes (20 1000 : Maren Huang RN)     Pain Description: soreness  (06/05/20 1000 : Maren Huang RN)  Comfort Measures Before/During Feeding: infant position adjusted (06/05/20 1000 : Maren Huang RN)  Milk Ejection Reflex: present (06/05/20 1000 : Maren Huang RN)           Latch Assistance: yes (06/05/20 1000 : Maren Huang RN)                               EQUIPMENT TYPE:  Breast Pump Type: double electric, personal (06/05/20 1000 : Maren Huang RN)  Breast Pump Flange Type: hard (06/05/20 1000 : Maren Huang RN)  Breast Pump Flange Size: 24 mm (06/05/20 1000 : Maren Huang RN)    Breast Care: Breastfeeding: lanolin to nipples (06/05/20 1000 : Maren Huang RN)  Breastfeeding Assistance: assisted with positioning, electric breast pump used, feeding cue recognition promoted, feeding on demand promoted, hand expression, infant stimulated to wakeful state (06/05/20 1000 : Maren Huang RN)     Breastfeeding Support: encouragement provided, lactation counseling provided, maternal hydration promoted (06/05/20 1000 : Maren Huang RN)          BREAST PUMPING:  Breast Pumping Interventions: frequent pumping encouraged, post-feed pumping encouraged (06/05/20 1000 : Maren Huang RN)  Breast Pumping: double electric breast pump utilized (06/05/20 1000 : Maren Huang RN)    LACTATION REFERRALS:  Lactation Referrals: outpatient lactation program (06/05/20 1000 : Maren Huang RN)

## 2020-06-05 NOTE — DISCHARGE SUMMARY
Date of Discharge:  2020    Discharge Diagnosis: vaginal delivery    Presenting Problem/History of Present Illness  Pregnancy [Z34.90]  Pregnancy [Z34.90]       Hospital Course  Patient is a 30 y.o. female presented for IOL d/t GHTN.  Delivered viable female infant per Dr. Telles.  BP have been nl/ mild range since delivery.  Is able to monitor at home-- will send/ call w/ readings next week.      Procedures Performed         Consults:   Consults     No orders found from 2020 to 6/3/2020.          Condition on Discharge:   Subjective   Postpartum Day 2 Vaginal Delivery.    The patient feels well without complaints.    Vital Signs  Temp:  [97 °F (36.1 °C)-98.7 °F (37.1 °C)] 98.7 °F (37.1 °C)  Heart Rate:  [] 93  Resp:  [16-18] 16  BP: (121-143)/(73-94) 138/87    Physical Exam:   General: Awake and alert   Abdomen: Fundus: firm, non tender    Extremities:  Calves NT bilaterally    Assessment/Plan     PPD2  S/P  -   Stable for discharge. Instructions reviewed      Discharge Disposition  Home or Self Care    Discharge Medications     Discharge Medications      New Medications      Instructions Start Date   docusate sodium 100 MG capsule   100 mg, Oral, 2 Times Daily      ibuprofen 800 MG tablet  Commonly known as:  ADVIL,MOTRIN   800 mg, Oral, Every 8 Hours PRN      oxyCODONE-acetaminophen 5-325 MG per tablet  Commonly known as:  PERCOCET   2 tablets, Oral, Every 4 Hours PRN         Continue These Medications      Instructions Start Date   cetirizine 10 MG tablet  Commonly known as:  zyrTEC   10 mg, Oral, Daily      prenatal (CLASSIC) vitamin 28-0.8 MG tablet tablet   1 tablet, Oral, Daily      psyllium 58.6 % packet  Commonly known as:  METAMUCIL   1 packet, Oral, Daily         Stop These Medications    acetaminophen 325 MG tablet  Commonly known as:  TYLENOL     magnesium oxide 400 tablet tablet  Commonly known as:  MAG-OX     VITAMIN-B COMPLEX PO              The patient has been prescribed a  controlled substance.  She has been counseled on the risks associated with using the medication.   The addictive potential of this medication and alternatives were discussed carefully with this patient and she demonstrated understanding.  A HERSON report has been obtained and reviewed.       Activity at Discharge: restrictions reviewed    Follow-up Appointments  No future appointments.      Test Results Pending at Discharge       JANINA Ram  06/05/20  10:02

## 2020-06-05 NOTE — NURSING NOTE
Pt passed golf ball size clot with no placental fragments found. FFU/1.bleeding light.  notified. No new orders just continue to monitor. BP elevated also but pt was crying, Pt educated on bleeding precautions.

## 2020-06-05 NOTE — PLAN OF CARE
Problem: Patient Care Overview  Goal: Plan of Care Review  Outcome: Outcome(s) achieved  Flowsheets  Taken 6/3/2020 1511 by Lyudmila Kessler, RN  Progress: improving  Taken 6/4/2020 1945 by Ivone Gloria RN  Plan of Care Reviewed With: patient;spouse  Taken 6/5/2020 0809 by Chelsea Hercules, RN  Outcome Summary: Pain well controlled, ambulating well, breastfeeding needs improvement, d/c today

## 2020-06-09 ENCOUNTER — TELEPHONE (OUTPATIENT)
Dept: LACTATION | Facility: HOSPITAL | Age: 30
End: 2020-06-09

## 2021-04-16 ENCOUNTER — BULK ORDERING (OUTPATIENT)
Dept: CASE MANAGEMENT | Facility: OTHER | Age: 31
End: 2021-04-16

## 2021-04-16 DIAGNOSIS — Z23 IMMUNIZATION DUE: ICD-10-CM

## 2021-12-17 ENCOUNTER — TRANSCRIBE ORDERS (OUTPATIENT)
Dept: LAB | Facility: HOSPITAL | Age: 31
End: 2021-12-17

## 2021-12-17 ENCOUNTER — LAB (OUTPATIENT)
Dept: LAB | Facility: HOSPITAL | Age: 31
End: 2021-12-17

## 2021-12-17 DIAGNOSIS — Z01.818 OTHER SPECIFIED PRE-OPERATIVE EXAMINATION: ICD-10-CM

## 2021-12-17 DIAGNOSIS — Z01.818 OTHER SPECIFIED PRE-OPERATIVE EXAMINATION: Primary | ICD-10-CM

## 2021-12-17 LAB — SARS-COV-2 ORF1AB RESP QL NAA+PROBE: NOT DETECTED

## 2021-12-17 PROCEDURE — C9803 HOPD COVID-19 SPEC COLLECT: HCPCS

## 2021-12-17 PROCEDURE — U0004 COV-19 TEST NON-CDC HGH THRU: HCPCS

## 2021-12-17 RX ORDER — MULTIPLE VITAMINS W/ MINERALS TAB 9MG-400MCG
1 TAB ORAL DAILY
Status: ON HOLD | COMMUNITY
End: 2022-10-07

## 2021-12-20 ENCOUNTER — HOSPITAL ENCOUNTER (OUTPATIENT)
Facility: HOSPITAL | Age: 31
Setting detail: HOSPITAL OUTPATIENT SURGERY
Discharge: HOME | End: 2021-12-20
Attending: OBSTETRICS & GYNECOLOGY | Admitting: OBSTETRICS & GYNECOLOGY

## 2021-12-20 ENCOUNTER — ANESTHESIA EVENT (OUTPATIENT)
Dept: PERIOP | Facility: HOSPITAL | Age: 31
End: 2021-12-20

## 2021-12-20 ENCOUNTER — ANESTHESIA (OUTPATIENT)
Dept: PERIOP | Facility: HOSPITAL | Age: 31
End: 2021-12-20

## 2021-12-20 VITALS
SYSTOLIC BLOOD PRESSURE: 119 MMHG | RESPIRATION RATE: 16 BRPM | HEART RATE: 83 BPM | HEIGHT: 67 IN | BODY MASS INDEX: 26.68 KG/M2 | WEIGHT: 170 LBS | OXYGEN SATURATION: 96 % | DIASTOLIC BLOOD PRESSURE: 77 MMHG | TEMPERATURE: 99.6 F

## 2021-12-20 DIAGNOSIS — Z98.890 POST-OPERATIVE STATE: Primary | ICD-10-CM

## 2021-12-20 LAB
B-HCG UR QL: POSITIVE
BASOPHILS # BLD AUTO: 0.05 10*3/MM3 (ref 0–0.2)
BASOPHILS NFR BLD AUTO: 0.6 % (ref 0–1.5)
DEPRECATED RDW RBC AUTO: 38.1 FL (ref 37–54)
EOSINOPHIL # BLD AUTO: 0.2 10*3/MM3 (ref 0–0.4)
EOSINOPHIL NFR BLD AUTO: 2.4 % (ref 0.3–6.2)
ERYTHROCYTE [DISTWIDTH] IN BLOOD BY AUTOMATED COUNT: 11.8 % (ref 12.3–15.4)
EXPIRATION DATE: ABNORMAL
HCT VFR BLD AUTO: 41.3 % (ref 34–46.6)
HGB BLD-MCNC: 13.9 G/DL (ref 12–15.9)
IMM GRANULOCYTES # BLD AUTO: 0.02 10*3/MM3 (ref 0–0.05)
IMM GRANULOCYTES NFR BLD AUTO: 0.2 % (ref 0–0.5)
INTERNAL NEGATIVE CONTROL: NEGATIVE
INTERNAL POSITIVE CONTROL: POSITIVE
LYMPHOCYTES # BLD AUTO: 2.33 10*3/MM3 (ref 0.7–3.1)
LYMPHOCYTES NFR BLD AUTO: 28.3 % (ref 19.6–45.3)
Lab: ABNORMAL
MCH RBC QN AUTO: 30.1 PG (ref 26.6–33)
MCHC RBC AUTO-ENTMCNC: 33.7 G/DL (ref 31.5–35.7)
MCV RBC AUTO: 89.4 FL (ref 79–97)
MONOCYTES # BLD AUTO: 0.61 10*3/MM3 (ref 0.1–0.9)
MONOCYTES NFR BLD AUTO: 7.4 % (ref 5–12)
NEUTROPHILS NFR BLD AUTO: 5.03 10*3/MM3 (ref 1.7–7)
NEUTROPHILS NFR BLD AUTO: 61.1 % (ref 42.7–76)
NRBC BLD AUTO-RTO: 0 /100 WBC (ref 0–0.2)
PLATELET # BLD AUTO: 303 10*3/MM3 (ref 140–450)
PMV BLD AUTO: 10.7 FL (ref 6–12)
RBC # BLD AUTO: 4.62 10*6/MM3 (ref 3.77–5.28)
WBC NRBC COR # BLD: 8.24 10*3/MM3 (ref 3.4–10.8)

## 2021-12-20 PROCEDURE — 88305 TISSUE EXAM BY PATHOLOGIST: CPT | Performed by: OBSTETRICS & GYNECOLOGY

## 2021-12-20 PROCEDURE — 85025 COMPLETE CBC W/AUTO DIFF WBC: CPT | Performed by: OBSTETRICS & GYNECOLOGY

## 2021-12-20 PROCEDURE — 25010000002 ONDANSETRON PER 1 MG: Performed by: NURSE ANESTHETIST, CERTIFIED REGISTERED

## 2021-12-20 PROCEDURE — 81025 URINE PREGNANCY TEST: CPT | Performed by: ANESTHESIOLOGY

## 2021-12-20 PROCEDURE — C1782 MORCELLATOR: HCPCS | Performed by: OBSTETRICS & GYNECOLOGY

## 2021-12-20 PROCEDURE — 0 LIDOCAINE 1 % SOLUTION: Performed by: OBSTETRICS & GYNECOLOGY

## 2021-12-20 PROCEDURE — 25010000002 PROPOFOL 10 MG/ML EMULSION: Performed by: NURSE ANESTHETIST, CERTIFIED REGISTERED

## 2021-12-20 PROCEDURE — 25010000002 DEXAMETHASONE PER 1 MG: Performed by: NURSE ANESTHETIST, CERTIFIED REGISTERED

## 2021-12-20 RX ORDER — IBUPROFEN 800 MG/1
800 TABLET ORAL ONCE AS NEEDED
Status: COMPLETED | OUTPATIENT
Start: 2021-12-20 | End: 2021-12-20

## 2021-12-20 RX ORDER — LIDOCAINE HYDROCHLORIDE 10 MG/ML
0.5 INJECTION, SOLUTION EPIDURAL; INFILTRATION; INTRACAUDAL; PERINEURAL ONCE AS NEEDED
Status: DISCONTINUED | OUTPATIENT
Start: 2021-12-20 | End: 2021-12-20 | Stop reason: HOSPADM

## 2021-12-20 RX ORDER — LIDOCAINE HYDROCHLORIDE 10 MG/ML
INJECTION, SOLUTION INFILTRATION; PERINEURAL AS NEEDED
Status: DISCONTINUED | OUTPATIENT
Start: 2021-12-20 | End: 2021-12-20 | Stop reason: HOSPADM

## 2021-12-20 RX ORDER — MAGNESIUM HYDROXIDE 1200 MG/15ML
LIQUID ORAL AS NEEDED
Status: DISCONTINUED | OUTPATIENT
Start: 2021-12-20 | End: 2021-12-20 | Stop reason: HOSPADM

## 2021-12-20 RX ORDER — ACETAMINOPHEN 500 MG
500 TABLET ORAL ONCE
Status: COMPLETED | OUTPATIENT
Start: 2021-12-20 | End: 2021-12-20

## 2021-12-20 RX ORDER — FENTANYL CITRATE 50 UG/ML
50 INJECTION, SOLUTION INTRAMUSCULAR; INTRAVENOUS
Status: DISCONTINUED | OUTPATIENT
Start: 2021-12-20 | End: 2021-12-20 | Stop reason: HOSPADM

## 2021-12-20 RX ORDER — ONDANSETRON 2 MG/ML
INJECTION INTRAMUSCULAR; INTRAVENOUS AS NEEDED
Status: DISCONTINUED | OUTPATIENT
Start: 2021-12-20 | End: 2021-12-20 | Stop reason: SURG

## 2021-12-20 RX ORDER — MISOPROSTOL 200 UG/1
TABLET ORAL AS NEEDED
Status: DISCONTINUED | OUTPATIENT
Start: 2021-12-20 | End: 2021-12-20 | Stop reason: HOSPADM

## 2021-12-20 RX ORDER — DIPHENHYDRAMINE HYDROCHLORIDE 50 MG/ML
12.5 INJECTION INTRAMUSCULAR; INTRAVENOUS
Status: DISCONTINUED | OUTPATIENT
Start: 2021-12-20 | End: 2021-12-20 | Stop reason: HOSPADM

## 2021-12-20 RX ORDER — ONDANSETRON 2 MG/ML
4 INJECTION INTRAMUSCULAR; INTRAVENOUS ONCE AS NEEDED
Status: DISCONTINUED | OUTPATIENT
Start: 2021-12-20 | End: 2021-12-20 | Stop reason: HOSPADM

## 2021-12-20 RX ORDER — TRANEXAMIC ACID 100 MG/ML
INJECTION, SOLUTION INTRAVENOUS AS NEEDED
Status: DISCONTINUED | OUTPATIENT
Start: 2021-12-20 | End: 2021-12-20 | Stop reason: SURG

## 2021-12-20 RX ORDER — NALOXONE HCL 0.4 MG/ML
0.4 VIAL (ML) INJECTION AS NEEDED
Status: DISCONTINUED | OUTPATIENT
Start: 2021-12-20 | End: 2021-12-20 | Stop reason: HOSPADM

## 2021-12-20 RX ORDER — DEXAMETHASONE SODIUM PHOSPHATE 4 MG/ML
INJECTION, SOLUTION INTRA-ARTICULAR; INTRALESIONAL; INTRAMUSCULAR; INTRAVENOUS; SOFT TISSUE AS NEEDED
Status: DISCONTINUED | OUTPATIENT
Start: 2021-12-20 | End: 2021-12-20 | Stop reason: SURG

## 2021-12-20 RX ORDER — LIDOCAINE HYDROCHLORIDE 20 MG/ML
INJECTION, SOLUTION INFILTRATION; PERINEURAL AS NEEDED
Status: DISCONTINUED | OUTPATIENT
Start: 2021-12-20 | End: 2021-12-20 | Stop reason: SURG

## 2021-12-20 RX ORDER — SODIUM CHLORIDE 0.9 % (FLUSH) 0.9 %
10 SYRINGE (ML) INJECTION AS NEEDED
Status: DISCONTINUED | OUTPATIENT
Start: 2021-12-20 | End: 2021-12-20 | Stop reason: HOSPADM

## 2021-12-20 RX ORDER — SODIUM CHLORIDE 0.9 % (FLUSH) 0.9 %
10 SYRINGE (ML) INJECTION EVERY 12 HOURS SCHEDULED
Status: DISCONTINUED | OUTPATIENT
Start: 2021-12-20 | End: 2021-12-20 | Stop reason: HOSPADM

## 2021-12-20 RX ORDER — PROPOFOL 10 MG/ML
VIAL (ML) INTRAVENOUS AS NEEDED
Status: DISCONTINUED | OUTPATIENT
Start: 2021-12-20 | End: 2021-12-20 | Stop reason: SURG

## 2021-12-20 RX ORDER — OXYCODONE HYDROCHLORIDE AND ACETAMINOPHEN 5; 325 MG/1; MG/1
1 TABLET ORAL EVERY 6 HOURS PRN
Qty: 10 TABLET | Refills: 0 | Status: SHIPPED | OUTPATIENT
Start: 2021-12-20 | End: 2022-08-12 | Stop reason: HOSPADM

## 2021-12-20 RX ORDER — PROPOFOL 10 MG/ML
VIAL (ML) INTRAVENOUS CONTINUOUS PRN
Status: DISCONTINUED | OUTPATIENT
Start: 2021-12-20 | End: 2021-12-20

## 2021-12-20 RX ORDER — HYDRALAZINE HYDROCHLORIDE 20 MG/ML
5 INJECTION INTRAMUSCULAR; INTRAVENOUS
Status: DISCONTINUED | OUTPATIENT
Start: 2021-12-20 | End: 2021-12-20 | Stop reason: HOSPADM

## 2021-12-20 RX ORDER — PROMETHAZINE HYDROCHLORIDE 25 MG/1
25 TABLET ORAL ONCE AS NEEDED
Status: DISCONTINUED | OUTPATIENT
Start: 2021-12-20 | End: 2021-12-20 | Stop reason: HOSPADM

## 2021-12-20 RX ORDER — SODIUM CHLORIDE, SODIUM LACTATE, POTASSIUM CHLORIDE, CALCIUM CHLORIDE 600; 310; 30; 20 MG/100ML; MG/100ML; MG/100ML; MG/100ML
9 INJECTION, SOLUTION INTRAVENOUS CONTINUOUS
Status: DISCONTINUED | OUTPATIENT
Start: 2021-12-20 | End: 2021-12-20 | Stop reason: HOSPADM

## 2021-12-20 RX ORDER — MIDAZOLAM HYDROCHLORIDE 1 MG/ML
1 INJECTION INTRAMUSCULAR; INTRAVENOUS
Status: DISCONTINUED | OUTPATIENT
Start: 2021-12-20 | End: 2021-12-20 | Stop reason: HOSPADM

## 2021-12-20 RX ORDER — PROPOFOL 10 MG/ML
VIAL (ML) INTRAVENOUS AS NEEDED
Status: DISCONTINUED | OUTPATIENT
Start: 2021-12-20 | End: 2021-12-20

## 2021-12-20 RX ORDER — PROPOFOL 10 MG/ML
VIAL (ML) INTRAVENOUS CONTINUOUS PRN
Status: DISCONTINUED | OUTPATIENT
Start: 2021-12-20 | End: 2021-12-20 | Stop reason: SURG

## 2021-12-20 RX ADMIN — Medication 100 MG: at 08:29

## 2021-12-20 RX ADMIN — Medication 200 MG: at 08:34

## 2021-12-20 RX ADMIN — SODIUM CHLORIDE, POTASSIUM CHLORIDE, SODIUM LACTATE AND CALCIUM CHLORIDE 9 ML/HR: 600; 310; 30; 20 INJECTION, SOLUTION INTRAVENOUS at 07:26

## 2021-12-20 RX ADMIN — TRANEXAMIC ACID 1000 MG: 1 INJECTION, SOLUTION INTRAVENOUS at 08:30

## 2021-12-20 RX ADMIN — DEXAMETHASONE SODIUM PHOSPHATE 6 MG: 4 INJECTION, SOLUTION INTRAMUSCULAR; INTRAVENOUS at 08:36

## 2021-12-20 RX ADMIN — ONDANSETRON 4 MG: 2 INJECTION INTRAMUSCULAR; INTRAVENOUS at 08:36

## 2021-12-20 RX ADMIN — LIDOCAINE HYDROCHLORIDE 50 MG: 20 INJECTION, SOLUTION INFILTRATION; PERINEURAL at 08:29

## 2021-12-20 RX ADMIN — ACETAMINOPHEN 500 MG: 500 TABLET ORAL at 07:31

## 2021-12-20 RX ADMIN — PROPOFOL 200 MCG/KG/MIN: 10 INJECTION, EMULSION INTRAVENOUS at 08:29

## 2021-12-20 RX ADMIN — IBUPROFEN 800 MG: 800 TABLET, FILM COATED ORAL at 10:14

## 2021-12-20 NOTE — ANESTHESIA PREPROCEDURE EVALUATION
Anesthesia Evaluation     NPO Solid Status: > 8 hours             Airway   Mallampati: II  TM distance: >3 FB  Neck ROM: full  No difficulty expected  Dental - normal exam     Pulmonary - normal exam   Cardiovascular     Rhythm: regular    (+) hypertension,       Neuro/Psych- negative ROS  GI/Hepatic/Renal/Endo    (+)  GERD,      Musculoskeletal     Abdominal    Substance History      OB/GYN          Other                        Anesthesia Plan    ASA 1     MAC   (D/W pt. MAC and possible awareness intra op.  Pt understands MAC and GA are not the same and the possibility of GA being required for failed MAC)  Postoperative Plan: Expected vent after surgery

## 2021-12-20 NOTE — H&P
[facility]   HISTORY AND PHYSICAL    Patient Name:Xochilt Oreilly  : 1990  MRN: [id number]  Primary Care Physician: Amrit Bloom MD  Date of admission: 2021    Subjective   Subjective     Chief Complaint: recurrent pregnancy loss, slow declinebeta hcg    History of Present Illness   Xochilt Oreilly is a 31 y.o. female with recurrent pregnancy loss, most recently s/p suction D&C, now with slowly declining hcg.     Review of Systems      Personal History     Past Medical History:   Diagnosis Date   • Anxiety     had short term treatmeny    • COVID-19 virus infection 2020   • Environmental allergies    • GERD (gastroesophageal reflux disease)    • Gestational hypertension     elevated for 1 week   • Hypoglycemia    • Miscarriage    • Retained placental fragment        Past Surgical History:   Procedure Laterality Date   • D & C WITH SUCTION N/A 2019    Procedure: SUCTION DILATATION AND CURETTAGE;  Surgeon: Kathy Bai MD;  Location: [facility];  Service: Obstetrics/Gynecology   • D & C WITH SUCTION  2021    IN OFFICE [facility]OMEN FIRST    • EAR TUBES     • TONSILLECTOMY         Family History: Her family history includes Migraines in her mother; No Known Problems in her father and sister.     Social History: She  reports that she has never smoked. She has never used smokeless tobacco. She reports that she does not drink alcohol and does not use drugs.    Home Medications:  cetirizine, multivitamin with minerals, norethindrone, and norethindrone-ethinyl estradiol    Allergies:  She is allergic to shellfish-derived products, norco [hydrocodone-acetaminophen], and septra [sulfamethoxazole-trimethoprim].    Objective    Objective     Vitals:    Temp:  [98.6 °F (37 °C)] 98.6 °F (37 °C)  Heart Rate:  [76] 76  Resp:  [18] 18  BP: (123)/(67) 123/67    Physical Exam   See most recent clinic note  Result Review    Result Review:  I have personally reviewed the results from the time of  this admission to 12/20/2021 07:59 EST and agree with these findings:  []  Laboratory  []  Microbiology  []  Radiology  []  EKG/Telemetry   []  Cardiology/Vascular   []  Pathology  []  Old records  []  Other:    Assessment/Plan   Assessment / Plan     Brief Patient Summary:  Xochilt Oreilly is a 31 y.o. female with recurrent SAB    Active Hospital Problems:  There are no active hospital problems to display for this patient.    Plan:   Hysteroscopy/D&C, possible myosure    DVT prophylaxis:  No DVT prophylaxis order currently exists.    Kathy Bai MD

## 2021-12-20 NOTE — ANESTHESIA POSTPROCEDURE EVALUATION
"Patient: Xochilt Oreilly    Procedure Summary     Date: 12/20/21 Room / Location:  [facility]  [facility]  [facility]    Anesthesia Start: 0821 Anesthesia Stop: 0906    Procedure: HYSTEROSCOPY  DILATATION AND CURETTAGE WITH MYOSURE (N/A Uterus) Diagnosis:     Surgeons: Kathy Bai MD Provider: Chuy Wolfe MD    Anesthesia Type: MAC ASA Status: 1          Anesthesia Type: MAC    Vitals  Vitals Value Taken Time   /72 12/20/21 1016   Temp 37.6 °C (99.6 °F) 12/20/21 1015   Pulse 81 12/20/21 1022   Resp 16 12/20/21 1015   SpO2 95 % 12/20/21 1022   Vitals shown include unvalidated device data.        Post Anesthesia Care and Evaluation    Patient location during evaluation: bedside  Patient participation: complete - patient participated  Level of consciousness: awake and alert  Pain management: adequate  Airway patency: patent  Anesthetic complications: No anesthetic complications    Cardiovascular status: acceptable  Respiratory status: acceptable  Hydration status: acceptable    Comments: /77   Pulse 83   Temp 37.6 °C (99.6 °F) (Temporal)   Resp 16   Ht 170.2 cm (67\")   Wt 77.1 kg (170 lb)   LMP 08/27/2021   SpO2 96%   BMI 26.63 kg/m²       "

## 2021-12-20 NOTE — ANESTHESIA PROCEDURE NOTES
Airway  Urgency: elective    Date/Time: 12/20/2021 8:34 AM  Airway not difficult    General Information and Staff    Patient location during procedure: OR  Anesthesiologist: Chuy Wolfe MD  CRNA: Norma Franklin CRNA    Indications and Patient Condition  Indications for airway management: airway protection    Preoxygenated: yes  MILS maintained throughout      Final Airway Details  Final airway type: supraglottic airway      Successful airway: classic  Size 4    Number of attempts at approach: 1  Assessment: lips, teeth, and gum same as pre-op    Additional Comments  Placed with ease. Vent with ease. Teeth as pre-op. Secured to face.

## 2021-12-20 NOTE — OP NOTE
Patient Name: Xochilt Oreilly  :  1990  MRN:  [id number]      Date of Service:  21      Surgeon: Kathy Bai MD       Pre-operative diagnosis(es): Recurrent pregnancy loss with slow decline in beta HCG     Post-operative diagnosis(es): Possible retained POCs with intrauterine adhesive disease   Procedure(s): Procedure(s):  HYSTEROSCOPY  DILATATION AND CURETTAGE WITH MYOSURE           Anesthesia: Type: Monitored Anesthesia Care         Operative findings: On hysteroscopy there was noted to be a collection of white-appearing tissue at the fundus suspicious for products of conception. This was adherent to a thick band of adhesion from the anterior to posterior wall of the uterus at the fundus midline.  All easily resected. The endometrium was otherwise normal in appearance.     Specimens removed: POCs and adhesive disease           EBL: 50cc     Indication for surgery:  As above    Procedure:   Patient was taken operating room where IV sedation was obtained without difficulty.  She was then placed in the dorsal lithotomy position in stirrups and prepped and draped in the normal sterile fashion.  A speculum was placed within the vagina and the cervix was grasped with a tenaculum.  A paracervical block was then performed injecting 1% lidocaine at the 4 and 7:00 positions of the cervical vaginal junction.  10 cc was injected at each site.   The cervix was then gently dilated in order to allow passage of the operative hysteroscope and the above findings were noted.  The myosure device was inserted and the tissue and adhesion was resected.  The hysteroscope and myosure were removed.  1gm TXA and cytotec 600mcg PA were given due to history of bleeding after D&C. The  Tenaculum and speculum was then removed.  Sponge and needle counts are correct ×2.  She was awakened from anesthesia and taken to face to recovery in stable condition.                                                 Kathy Bai  MD  12/20/21  13:39 EST

## 2021-12-21 LAB
LAB AP CASE REPORT: NORMAL
PATH REPORT.FINAL DX SPEC: NORMAL
PATH REPORT.GROSS SPEC: NORMAL

## 2022-03-07 LAB
EXTERNAL HEPATITIS B SURFACE ANTIGEN: NEGATIVE
EXTERNAL HEPATITIS C AB: NEGATIVE
EXTERNAL RUBELLA QUALITATIVE: NORMAL
EXTERNAL SYPHILIS RPR SCREEN: NORMAL
HIV1 P24 AG SERPL QL IA: NEGATIVE

## 2022-04-06 ENCOUNTER — HOSPITAL ENCOUNTER (OUTPATIENT)
Facility: HOSPITAL | Age: 32
End: 2022-04-06
Attending: OBSTETRICS & GYNECOLOGY | Admitting: OBSTETRICS & GYNECOLOGY

## 2022-04-06 ENCOUNTER — HOSPITAL ENCOUNTER (OUTPATIENT)
Facility: HOSPITAL | Age: 32
Discharge: HOME | End: 2022-04-06
Attending: OBSTETRICS & GYNECOLOGY | Admitting: OBSTETRICS & GYNECOLOGY

## 2022-04-06 VITALS
OXYGEN SATURATION: 98 % | DIASTOLIC BLOOD PRESSURE: 73 MMHG | BODY MASS INDEX: 27.25 KG/M2 | WEIGHT: 173.6 LBS | SYSTOLIC BLOOD PRESSURE: 118 MMHG | HEART RATE: 96 BPM | HEIGHT: 67 IN | TEMPERATURE: 99.2 F | RESPIRATION RATE: 17 BRPM

## 2022-04-06 LAB
ABO GROUP BLD: NORMAL
BASOPHILS # BLD AUTO: 0.05 10*3/MM3 (ref 0–0.2)
BASOPHILS NFR BLD AUTO: 0.3 % (ref 0–1.5)
BLD GP AB SCN SERPL QL: NEGATIVE
DEPRECATED RDW RBC AUTO: 39.4 FL (ref 37–54)
EOSINOPHIL # BLD AUTO: 0.1 10*3/MM3 (ref 0–0.4)
EOSINOPHIL NFR BLD AUTO: 0.7 % (ref 0.3–6.2)
ERYTHROCYTE [DISTWIDTH] IN BLOOD BY AUTOMATED COUNT: 12.3 % (ref 12.3–15.4)
HCT VFR BLD AUTO: 38.5 % (ref 34–46.6)
HGB BLD-MCNC: 13.4 G/DL (ref 12–15.9)
IMM GRANULOCYTES # BLD AUTO: 0.06 10*3/MM3 (ref 0–0.05)
IMM GRANULOCYTES NFR BLD AUTO: 0.4 % (ref 0–0.5)
LYMPHOCYTES # BLD AUTO: 2 10*3/MM3 (ref 0.7–3.1)
LYMPHOCYTES NFR BLD AUTO: 14 % (ref 19.6–45.3)
MCH RBC QN AUTO: 30.7 PG (ref 26.6–33)
MCHC RBC AUTO-ENTMCNC: 34.8 G/DL (ref 31.5–35.7)
MCV RBC AUTO: 88.1 FL (ref 79–97)
MONOCYTES # BLD AUTO: 0.79 10*3/MM3 (ref 0.1–0.9)
MONOCYTES NFR BLD AUTO: 5.5 % (ref 5–12)
NEUTROPHILS NFR BLD AUTO: 11.32 10*3/MM3 (ref 1.7–7)
NEUTROPHILS NFR BLD AUTO: 79.1 % (ref 42.7–76)
NRBC BLD AUTO-RTO: 0.1 /100 WBC (ref 0–0.2)
PLATELET # BLD AUTO: 265 10*3/MM3 (ref 140–450)
PMV BLD AUTO: 11 FL (ref 6–12)
RBC # BLD AUTO: 4.37 10*6/MM3 (ref 3.77–5.28)
RH BLD: POSITIVE
T&S EXPIRATION DATE: NORMAL
WBC NRBC COR # BLD: 14.32 10*3/MM3 (ref 3.4–10.8)

## 2022-04-06 PROCEDURE — G0463 HOSPITAL OUTPT CLINIC VISIT: HCPCS

## 2022-04-06 PROCEDURE — 86900 BLOOD TYPING SEROLOGIC ABO: CPT | Performed by: OBSTETRICS & GYNECOLOGY

## 2022-04-06 PROCEDURE — 86901 BLOOD TYPING SEROLOGIC RH(D): CPT | Performed by: OBSTETRICS & GYNECOLOGY

## 2022-04-06 PROCEDURE — 86850 RBC ANTIBODY SCREEN: CPT | Performed by: OBSTETRICS & GYNECOLOGY

## 2022-04-06 PROCEDURE — 36415 COLL VENOUS BLD VENIPUNCTURE: CPT | Performed by: OBSTETRICS & GYNECOLOGY

## 2022-04-06 PROCEDURE — 85025 COMPLETE CBC W/AUTO DIFF WBC: CPT | Performed by: OBSTETRICS & GYNECOLOGY

## 2022-04-06 RX ORDER — DOCUSATE SODIUM 100 MG/1
100 CAPSULE, LIQUID FILLED ORAL 2 TIMES DAILY
COMMUNITY
End: 2022-10-07 | Stop reason: HOSPADM

## 2022-04-06 RX ORDER — SODIUM CHLORIDE 0.9 % (FLUSH) 0.9 %
10 SYRINGE (ML) INJECTION AS NEEDED
Status: DISCONTINUED | OUTPATIENT
Start: 2022-04-06 | End: 2022-04-06 | Stop reason: HOSPADM

## 2022-04-06 RX ORDER — SODIUM CHLORIDE 0.9 % (FLUSH) 0.9 %
3 SYRINGE (ML) INJECTION EVERY 12 HOURS SCHEDULED
Status: DISCONTINUED | OUTPATIENT
Start: 2022-04-06 | End: 2022-04-06 | Stop reason: HOSPADM

## 2022-08-10 ENCOUNTER — TRANSCRIBE ORDERS (OUTPATIENT)
Dept: DIABETES SERVICES | Facility: HOSPITAL | Age: 32
End: 2022-08-10

## 2022-08-10 DIAGNOSIS — O24.410 DIET CONTROLLED GESTATIONAL DIABETES MELLITUS (GDM) IN SECOND TRIMESTER: Primary | ICD-10-CM

## 2022-08-11 ENCOUNTER — HOSPITAL ENCOUNTER (OUTPATIENT)
Dept: DIABETES SERVICES | Facility: HOSPITAL | Age: 32
Discharge: HOME | End: 2022-08-11
Admitting: NURSE PRACTITIONER

## 2022-08-11 PROCEDURE — G0109 DIAB MANAGE TRN IND/GROUP: HCPCS

## 2022-08-12 ENCOUNTER — HOSPITAL ENCOUNTER (OUTPATIENT)
Facility: HOSPITAL | Age: 32
End: 2022-08-12
Attending: OBSTETRICS & GYNECOLOGY | Admitting: OBSTETRICS & GYNECOLOGY

## 2022-08-12 ENCOUNTER — HOSPITAL ENCOUNTER (EMERGENCY)
Facility: HOSPITAL | Age: 32
Discharge: HOME | End: 2022-08-12
Attending: OBSTETRICS & GYNECOLOGY | Admitting: OBSTETRICS & GYNECOLOGY

## 2022-08-12 VITALS
HEART RATE: 108 BPM | DIASTOLIC BLOOD PRESSURE: 72 MMHG | RESPIRATION RATE: 16 BRPM | SYSTOLIC BLOOD PRESSURE: 108 MMHG | OXYGEN SATURATION: 98 %

## 2022-08-12 LAB
BACTERIA UR QL AUTO: ABNORMAL /HPF
BILIRUB UR QL STRIP: NEGATIVE
BLOODY SPECIMEN?: NO
CLARITY UR: ABNORMAL
COLOR UR: YELLOW
FIBRONECTIN FETAL VAG QL: NEGATIVE
GLUCOSE UR STRIP-MCNC: NEGATIVE MG/DL
HGB UR QL STRIP.AUTO: ABNORMAL
HYALINE CASTS UR QL AUTO: ABNORMAL /LPF
KETONES UR QL STRIP: ABNORMAL
LEUKOCYTE ESTERASE UR QL STRIP.AUTO: ABNORMAL
NITRITE UR QL STRIP: NEGATIVE
PH UR STRIP.AUTO: 6.5 [PH] (ref 5–8)
PROT UR QL STRIP: NEGATIVE
RBC # UR STRIP: ABNORMAL /HPF
REF LAB TEST METHOD: ABNORMAL
SP GR UR STRIP: 1.01 (ref 1–1.03)
SQUAMOUS #/AREA URNS HPF: ABNORMAL /HPF
UROBILINOGEN UR QL STRIP: ABNORMAL
WBC # UR STRIP: ABNORMAL /HPF

## 2022-08-12 PROCEDURE — 99284 EMERGENCY DEPT VISIT MOD MDM: CPT | Performed by: OBSTETRICS & GYNECOLOGY

## 2022-08-12 PROCEDURE — 82731 ASSAY OF FETAL FIBRONECTIN: CPT | Performed by: OBSTETRICS & GYNECOLOGY

## 2022-08-12 PROCEDURE — 59025 FETAL NON-STRESS TEST: CPT

## 2022-08-12 PROCEDURE — 81001 URINALYSIS AUTO W/SCOPE: CPT | Performed by: OBSTETRICS & GYNECOLOGY

## 2022-08-12 PROCEDURE — 59025 FETAL NON-STRESS TEST: CPT | Performed by: OBSTETRICS & GYNECOLOGY

## 2022-08-12 RX ORDER — OMEPRAZOLE 20 MG/1
20 CAPSULE, DELAYED RELEASE ORAL DAILY
COMMUNITY
End: 2022-10-07 | Stop reason: HOSPADM

## 2022-08-12 RX ORDER — ASPIRIN 81 MG/1
81 TABLET, CHEWABLE ORAL DAILY
COMMUNITY
End: 2022-10-07 | Stop reason: HOSPADM

## 2022-08-12 NOTE — OBED NOTES
[facility] Note [facility]        Patient Name: Xochilt Oreilly  YOB: 1990  MRN: [id number]  Admission Date: 2022  4:07 PM  Date of Service: 2022    Chief Complaint: Contractions (KOREY: pt reports uncomfortable ctx since yesterday.  Pt states they are not painful but take her breath away and last 20-30 seconds.  Denies LOF or VB.  +FM )        Subjective     Xochilt Oreilly is a 32 y.o. female  at 29w5d with Estimated Date of Delivery: 10/23/22 who presents with the chief complaint listed above.  She sees Kathy Bai MD for her prenatal care. Her pregnancy has been complicated by:  di/di twin gestation, history of GHTN.    Patient states she has had contractions on and off since yesterday.  She denies them being painful but states her abdomen gets very tight and they last 20-30 seconds.  Earlier, she had four in a half hour and was advised to come in.  She states she was checked earlier in pregnancy around 25 wga and was closed.  She denies discharge or bleeding.    She describes fetal movement as normal.  She denies rupture of membranes.  She denies vaginal bleeding. She is feeling contractions.          Objective   Patient Active Problem List    Diagnosis    • Gestational hypertension [O13.9]    • Pregnancy [Z34.90]    • Gestational hypertension without significant proteinuria in third trimester [O13.3]         OB History    Para Term  AB Living   5 1 1 0 3 1   SAB IAB Ectopic Molar Multiple Live Births   3 0 0 0 0 1      # Outcome Date GA Lbr Chandu/2nd Weight Sex Delivery Anes PTL Lv   5 Current            4 SAB 21     SAB      3 SAB 21     SAB      2 Term 20 39w1d 04:05 / 02:13 3294 g (7 lb 4.2 oz) F Vag-Spont EPI N NGOC      Birth Comments: scale 1      Name: YVROSE OREILLY      Apgar1: 8  Apgar5: 9   1 SAB 19 10w0d               Past Medical History:   Diagnosis Date   • Anxiety     had short term treatmeny    • COVID-19 virus infection  12/2020   • Environmental allergies    • GERD (gastroesophageal reflux disease)    • Gestational diabetes    • Gestational hypertension     elevated for 1 week   • Hypoglycemia    • Miscarriage    • Multiple gestation    • Retained placental fragment        Past Surgical History:   Procedure Laterality Date   • D & C HYSTEROSCOPY N/A 12/20/2021    Procedure: HYSTEROSCOPY  DILATATION AND CURETTAGE WITH MYOSURE;  Surgeon: Kathy Bai MD;  Location: [facility];  Service: Obstetrics/Gynecology;  Laterality: N/A;   • D & C WITH SUCTION N/A 7/5/2019    Procedure: SUCTION DILATATION AND CURETTAGE;  Surgeon: Kathy Bai MD;  Location: [facility];  Service: Obstetrics/Gynecology   • D & C WITH SUCTION  11/09/2021    IN OFFICE [facility]OMEN FIRST    • EAR TUBES     • TONSILLECTOMY         No current facility-administered medications on file prior to encounter.     Current Outpatient Medications on File Prior to Encounter   Medication Sig Dispense Refill   • aspirin 81 MG chewable tablet Chew 81 mg Daily.     • cetirizine (zyrTEC) 10 MG tablet Take 10 mg by mouth Daily.     • docusate sodium (COLACE) 100 MG capsule Take 100 mg by mouth 2 (Two) Times a Day.     • magnesium oxide (MAG-OX) 400 tablet tablet Take 400 mg by mouth Daily.     • multivitamin with minerals tablet tablet Take 1 tablet by mouth Daily.     • omeprazole (priLOSEC) 20 MG capsule Take 20 mg by mouth Daily.     • vitamin D3 125 MCG (5000 UT) capsule capsule Take 5,000 Units by mouth Daily.     • oxyCODONE-acetaminophen (Percocet) 5-325 MG per tablet Take 1 tablet by mouth Every 6 (Six) Hours As Needed for Moderate Pain . 10 tablet 0   • [DISCONTINUED] norethindrone (MICRONOR) 0.35 MG tablet Take  by mouth Daily.         Allergies   Allergen Reactions   • Shellfish-Derived Products Other (See Comments)     On allergy test   • Norco [Hydrocodone-Acetaminophen] Nausea And Vomiting and Hallucinations   • Septra [Sulfamethoxazole-Trimethoprim] Unknown  (See Comments)     Childhood allergy          Family History   Problem Relation Age of Onset   • Migraines Mother    • No Known Problems Father    • No Known Problems Sister    • Malig Hyperthermia Neg Hx        Social History     Socioeconomic History   • Marital status:    Tobacco Use   • Smoking status: Never Smoker   • Smokeless tobacco: Never Used   Vaping Use   • Vaping Use: Never used   Substance and Sexual Activity   • Alcohol use: No   • Drug use: No   • Sexual activity: Yes     Partners: Male           Review of Systems   Constitutional: Negative for chills, fatigue and fever.   HENT: Negative for congestion, rhinorrhea and sore throat.    Eyes: Negative for visual disturbance.   Respiratory: Negative.    Cardiovascular: Negative.    Gastrointestinal: Positive for abdominal pain. Negative for constipation, diarrhea, nausea and vomiting.   Genitourinary: Negative for difficulty urinating, dyspareunia, dysuria, flank pain, frequency, genital sores, hematuria, pelvic pain, urgency, vaginal bleeding, vaginal discharge and vaginal pain.   Neurological: Negative for dizziness, seizures, light-headedness and headaches.   Psychiatric/Behavioral: Negative for sleep disturbance. The patient is not nervous/anxious.           PHYSICAL EXAM:      VITAL SIGNS:  Vitals:    08/12/22 1648   BP: 108/72   BP Location: Right arm   Patient Position: Lying   Pulse: 108   Resp: 16   SpO2: 98%        FHT'S:                   Baseline:  140/145 BPM for Twin A/B  Variability:  Moderate = 6 - 25 BPM  Accelerations:  15 x 15 accelerations present     Decelerations:  absent  Contractions:   present irregular    Interpretation:    Reactive NST, CAT 1 tracing for both twins        PHYSICAL EXAM:    General: well developed; well nourished  no acute distress   Heart: Not performed.   Lungs  : breathing is unlabored     Abdomen: soft, non-tender; no masses  no umbilical or inguinal hernias are present  no hepato-splenomegaly        Cervix: was checked (by me): 1 cm / 40 % / -3  Cervical Dilation (cm): 1  Cervical Effacement: 40%  Fetal Station: -3  Cervical Position: posterior   Contractions: irregular        Extremities: peripheral pulses normal, no pedal edema, no clubbing or cyanosis      LABS AND TESTING ORDERED:  1. Uterine and fetal monitoring  2. Urinalysis  3. FFN    LAB RESULTS:    No results found for this or any previous visit (from the past 24 hour(s)).    Lab Results   Component Value Date    ABO A 2022    RH Positive 2022       Lab Results   Component Value Date    STREPGPB Negative 2020                 Assessment & Plan     ASSESSMENT/PLAN:  Xochilt Oreilly is a 32 y.o. female  at 29w5d who presented with: concern for  labor with twin gestation.  Cervix checked and found to be posterior but 1 cm dilated.  Some uterine irritability was noted on tocometer but a regular contraction pattern was not seen.  UA significant for ketones.  Patient was given hydration and observed for two hours.  An FFN was sent and negative.  Cervix was unchanged on repeat examination and still very posterior.  Low concern for  labor as patients irregular contractions likely caused in part by dehydration.  She was encouraged to hydrate and rest.  She was discharged home with return precautions reviewed.          Final Impression:  • Pregnancy at 29w5d  • Reactive NST.  CAT 1 tracing  • False  labor  • Maternal vital signs were reviewed and were unremarkable              Vitals:    22 1648   BP: 108/72   BP Location: Right arm   Patient Position: Lying   Pulse: 108   Resp: 16   SpO2: 98%   •     Lab Results   Component Value Date    STREPGPB Negative 2020   •   Lab Results   Component Value Date    ABO A 2022    RH Positive 2022   •   • COVID - 19 status unknown      PLAN:       I have spent 45 minutes including face to face time with the patient, greater than 50% in discussion of the  diagnosis (counseling) and/or coordination of care.     Tanya Cordova MD  8/12/2022  17:10 EDT  OB Hospitalist  Phone:  [phone]

## 2022-09-04 ENCOUNTER — APPOINTMENT (OUTPATIENT)
Dept: ULTRASOUND IMAGING | Facility: HOSPITAL | Age: 32
End: 2022-09-04

## 2022-09-04 ENCOUNTER — HOSPITAL ENCOUNTER (OUTPATIENT)
Facility: HOSPITAL | Age: 32
Discharge: HOME | End: 2022-09-05
Attending: OBSTETRICS & GYNECOLOGY | Admitting: OBSTETRICS & GYNECOLOGY

## 2022-09-04 PROBLEM — N30.01 HEMATURIA DUE TO ACUTE CYSTITIS: Status: ACTIVE | Noted: 2022-09-04

## 2022-09-04 LAB
ABO GROUP BLD: NORMAL
ALBUMIN SERPL-MCNC: 3.1 G/DL (ref 3.5–5.2)
ALBUMIN/GLOB SERPL: 1.6 G/DL
ALP SERPL-CCNC: 133 U/L (ref 39–117)
ALT SERPL W P-5'-P-CCNC: 9 U/L (ref 1–33)
ANION GAP SERPL CALCULATED.3IONS-SCNC: 9 MMOL/L (ref 5–15)
AST SERPL-CCNC: 11 U/L (ref 1–32)
BACTERIA UR QL AUTO: ABNORMAL /HPF
BASOPHILS # BLD AUTO: 0.05 10*3/MM3 (ref 0–0.2)
BASOPHILS NFR BLD AUTO: 0.4 % (ref 0–1.5)
BILIRUB SERPL-MCNC: 0.3 MG/DL (ref 0–1.2)
BILIRUB UR QL STRIP: NEGATIVE
BLD GP AB SCN SERPL QL: NEGATIVE
BUN SERPL-MCNC: 10 MG/DL (ref 6–20)
BUN/CREAT SERPL: 23.8 (ref 7–25)
CALCIUM SPEC-SCNC: 8.5 MG/DL (ref 8.6–10.5)
CHLORIDE SERPL-SCNC: 104 MMOL/L (ref 98–107)
CLARITY UR: ABNORMAL
CO2 SERPL-SCNC: 23 MMOL/L (ref 22–29)
COLOR UR: ABNORMAL
CREAT SERPL-MCNC: 0.42 MG/DL (ref 0.57–1)
DEPRECATED RDW RBC AUTO: 42.4 FL (ref 37–54)
EGFRCR SERPLBLD CKD-EPI 2021: 132.6 ML/MIN/1.73
EOSINOPHIL # BLD AUTO: 0.06 10*3/MM3 (ref 0–0.4)
EOSINOPHIL NFR BLD AUTO: 0.4 % (ref 0.3–6.2)
ERYTHROCYTE [DISTWIDTH] IN BLOOD BY AUTOMATED COUNT: 13.4 % (ref 12.3–15.4)
GLOBULIN UR ELPH-MCNC: 2 GM/DL
GLUCOSE BLDC GLUCOMTR-MCNC: 106 MG/DL (ref 70–130)
GLUCOSE BLDC GLUCOMTR-MCNC: 96 MG/DL (ref 70–130)
GLUCOSE SERPL-MCNC: 97 MG/DL (ref 65–99)
GLUCOSE UR STRIP-MCNC: NEGATIVE MG/DL
HCT VFR BLD AUTO: 33.9 % (ref 34–46.6)
HGB BLD-MCNC: 11.6 G/DL (ref 12–15.9)
HGB UR QL STRIP.AUTO: ABNORMAL
HYALINE CASTS UR QL AUTO: ABNORMAL /LPF
IMM GRANULOCYTES # BLD AUTO: 0.3 10*3/MM3 (ref 0–0.05)
IMM GRANULOCYTES NFR BLD AUTO: 2.2 % (ref 0–0.5)
KETONES UR QL STRIP: ABNORMAL
LEUKOCYTE ESTERASE UR QL STRIP.AUTO: ABNORMAL
LYMPHOCYTES # BLD AUTO: 1.98 10*3/MM3 (ref 0.7–3.1)
LYMPHOCYTES NFR BLD AUTO: 14.4 % (ref 19.6–45.3)
MCH RBC QN AUTO: 30.1 PG (ref 26.6–33)
MCHC RBC AUTO-ENTMCNC: 34.2 G/DL (ref 31.5–35.7)
MCV RBC AUTO: 87.8 FL (ref 79–97)
MONOCYTES # BLD AUTO: 1.13 10*3/MM3 (ref 0.1–0.9)
MONOCYTES NFR BLD AUTO: 8.2 % (ref 5–12)
NEUTROPHILS NFR BLD AUTO: 10.22 10*3/MM3 (ref 1.7–7)
NEUTROPHILS NFR BLD AUTO: 74.4 % (ref 42.7–76)
NITRITE UR QL STRIP: NEGATIVE
NRBC BLD AUTO-RTO: 0.1 /100 WBC (ref 0–0.2)
PH UR STRIP.AUTO: 5.5 [PH] (ref 5–8)
PLATELET # BLD AUTO: 190 10*3/MM3 (ref 140–450)
PMV BLD AUTO: 12.5 FL (ref 6–12)
POTASSIUM SERPL-SCNC: 4.3 MMOL/L (ref 3.5–5.2)
PROT SERPL-MCNC: 5.1 G/DL (ref 6–8.5)
PROT UR QL STRIP: ABNORMAL
RBC # BLD AUTO: 3.86 10*6/MM3 (ref 3.77–5.28)
RBC # UR STRIP: ABNORMAL /HPF
REF LAB TEST METHOD: ABNORMAL
RH BLD: POSITIVE
SODIUM SERPL-SCNC: 136 MMOL/L (ref 136–145)
SP GR UR STRIP: 1.02 (ref 1–1.03)
SQUAMOUS #/AREA URNS HPF: ABNORMAL /HPF
T&S EXPIRATION DATE: NORMAL
UROBILINOGEN UR QL STRIP: ABNORMAL
WBC # UR STRIP: ABNORMAL /HPF
WBC NRBC COR # BLD: 13.74 10*3/MM3 (ref 3.4–10.8)

## 2022-09-04 PROCEDURE — 96361 HYDRATE IV INFUSION ADD-ON: CPT | Performed by: OBSTETRICS & GYNECOLOGY

## 2022-09-04 PROCEDURE — 82962 GLUCOSE BLOOD TEST: CPT

## 2022-09-04 PROCEDURE — 63710000001 INSULIN DETEMIR PER 5 UNITS: Performed by: OBSTETRICS & GYNECOLOGY

## 2022-09-04 PROCEDURE — 99283 EMERGENCY DEPT VISIT LOW MDM: CPT | Performed by: OBSTETRICS & GYNECOLOGY

## 2022-09-04 PROCEDURE — 96361 HYDRATE IV INFUSION ADD-ON: CPT

## 2022-09-04 PROCEDURE — 86850 RBC ANTIBODY SCREEN: CPT | Performed by: OBSTETRICS & GYNECOLOGY

## 2022-09-04 PROCEDURE — 96376 TX/PRO/DX INJ SAME DRUG ADON: CPT | Performed by: OBSTETRICS & GYNECOLOGY

## 2022-09-04 PROCEDURE — 25010000002 CEFTRIAXONE PER 250 MG: Performed by: OBSTETRICS & GYNECOLOGY

## 2022-09-04 PROCEDURE — 86900 BLOOD TYPING SEROLOGIC ABO: CPT | Performed by: OBSTETRICS & GYNECOLOGY

## 2022-09-04 PROCEDURE — 81001 URINALYSIS AUTO W/SCOPE: CPT | Performed by: OBSTETRICS & GYNECOLOGY

## 2022-09-04 PROCEDURE — G0463 HOSPITAL OUTPT CLINIC VISIT: HCPCS

## 2022-09-04 PROCEDURE — 25010000002 BUTORPHANOL PER 1 MG: Performed by: OBSTETRICS & GYNECOLOGY

## 2022-09-04 PROCEDURE — 85025 COMPLETE CBC W/AUTO DIFF WBC: CPT | Performed by: OBSTETRICS & GYNECOLOGY

## 2022-09-04 PROCEDURE — 25010000002 ONDANSETRON PER 1 MG: Performed by: OBSTETRICS & GYNECOLOGY

## 2022-09-04 PROCEDURE — 59025 FETAL NON-STRESS TEST: CPT

## 2022-09-04 PROCEDURE — 80053 COMPREHEN METABOLIC PANEL: CPT | Performed by: OBSTETRICS & GYNECOLOGY

## 2022-09-04 PROCEDURE — 76775 US EXAM ABDO BACK WALL LIM: CPT

## 2022-09-04 PROCEDURE — 96375 TX/PRO/DX INJ NEW DRUG ADDON: CPT

## 2022-09-04 PROCEDURE — 86901 BLOOD TYPING SEROLOGIC RH(D): CPT | Performed by: OBSTETRICS & GYNECOLOGY

## 2022-09-04 PROCEDURE — 96375 TX/PRO/DX INJ NEW DRUG ADDON: CPT | Performed by: OBSTETRICS & GYNECOLOGY

## 2022-09-04 PROCEDURE — 87086 URINE CULTURE/COLONY COUNT: CPT | Performed by: OBSTETRICS & GYNECOLOGY

## 2022-09-04 RX ORDER — SODIUM CHLORIDE 9 MG/ML
125 INJECTION, SOLUTION INTRAVENOUS CONTINUOUS
Status: DISCONTINUED | OUTPATIENT
Start: 2022-09-04 | End: 2022-09-05

## 2022-09-04 RX ORDER — ACETAMINOPHEN 325 MG/1
650 TABLET ORAL EVERY 6 HOURS PRN
Status: DISCONTINUED | OUTPATIENT
Start: 2022-09-04 | End: 2022-09-05 | Stop reason: HOSPADM

## 2022-09-04 RX ORDER — SODIUM CHLORIDE, SODIUM LACTATE, POTASSIUM CHLORIDE, CALCIUM CHLORIDE 600; 310; 30; 20 MG/100ML; MG/100ML; MG/100ML; MG/100ML
125 INJECTION, SOLUTION INTRAVENOUS CONTINUOUS
Status: DISCONTINUED | OUTPATIENT
Start: 2022-09-04 | End: 2022-09-04

## 2022-09-04 RX ORDER — ONDANSETRON 2 MG/ML
4 INJECTION INTRAMUSCULAR; INTRAVENOUS EVERY 6 HOURS PRN
Status: DISCONTINUED | OUTPATIENT
Start: 2022-09-04 | End: 2022-09-05 | Stop reason: HOSPADM

## 2022-09-04 RX ORDER — ONDANSETRON 2 MG/ML
4 INJECTION INTRAMUSCULAR; INTRAVENOUS ONCE
Status: COMPLETED | OUTPATIENT
Start: 2022-09-04 | End: 2022-09-04

## 2022-09-04 RX ORDER — SODIUM CHLORIDE 9 MG/ML
125 INJECTION, SOLUTION INTRAVENOUS CONTINUOUS
Status: DISCONTINUED | OUTPATIENT
Start: 2022-09-04 | End: 2022-09-05 | Stop reason: HOSPADM

## 2022-09-04 RX ADMIN — SODIUM CHLORIDE, POTASSIUM CHLORIDE, SODIUM LACTATE AND CALCIUM CHLORIDE 125 ML/HR: 600; 310; 30; 20 INJECTION, SOLUTION INTRAVENOUS at 09:32

## 2022-09-04 RX ADMIN — ACETAMINOPHEN 650 MG: 325 TABLET, FILM COATED ORAL at 15:48

## 2022-09-04 RX ADMIN — ONDANSETRON 4 MG: 2 INJECTION INTRAMUSCULAR; INTRAVENOUS at 11:04

## 2022-09-04 RX ADMIN — SODIUM CHLORIDE 125 ML/HR: 9 INJECTION, SOLUTION INTRAVENOUS at 19:20

## 2022-09-04 RX ADMIN — SODIUM CHLORIDE, POTASSIUM CHLORIDE, SODIUM LACTATE AND CALCIUM CHLORIDE 1000 ML: 600; 310; 30; 20 INJECTION, SOLUTION INTRAVENOUS at 08:32

## 2022-09-04 RX ADMIN — BUTORPHANOL TARTRATE 1 MG: 2 INJECTION, SOLUTION INTRAMUSCULAR; INTRAVENOUS at 09:58

## 2022-09-04 RX ADMIN — BUTORPHANOL TARTRATE 1 MG: 2 INJECTION, SOLUTION INTRAMUSCULAR; INTRAVENOUS at 08:41

## 2022-09-04 RX ADMIN — SODIUM CHLORIDE 125 ML/HR: 9 INJECTION, SOLUTION INTRAVENOUS at 10:50

## 2022-09-04 RX ADMIN — CEFTRIAXONE SODIUM 1 G: 1 INJECTION, POWDER, FOR SOLUTION INTRAMUSCULAR; INTRAVENOUS at 10:54

## 2022-09-04 RX ADMIN — ONDANSETRON 4 MG: 2 INJECTION INTRAMUSCULAR; INTRAVENOUS at 08:38

## 2022-09-04 RX ADMIN — INSULIN DETEMIR 22 UNITS: 100 INJECTION, SOLUTION SUBCUTANEOUS at 22:10

## 2022-09-04 NOTE — H&P
[facility]  Obstetric History and Physical    Chief Complaint   Patient presents with   • Abdominal Cramping     Jose to ld for c/o constant lower abd pain that started at 0600, pt rates pain 9, pt also has bloody urine noted on admission, pt reports first time she has noticed urine. Denies leakage of fluids, reports babies active       Subjective     HPI:    Patient is a 32 y.o. female  currently at 33w0d, who presents with complaints of lower abdominal pain and dark urine starting about 6 am today. Patient also notes nausea, fetal movement. No contractions, VB or LOF    Her prenatal care is complicated by  diabetes  GDM A2 and multiple gestation  DC/DA twins.  Her previous obstetric/gynecological history is noted for is non-contributory.    The following portions of the patients history were reviewed and updated as appropriate:   current medications, allergies, past medical history, past surgical history, past family history, past social history and current problem list.     Prenatal Information:  Prenatal Results     POC Urine Glucose/Protein     Test Value Reference Range Date Time    Urine Glucose        Urine Protein  1+ mg/dL Negative 20 1424          Initial Prenatal Labs     Test Value Reference Range Date Time    Hemoglobin  13.4 g/dL 12.0 - 15.9 22 0949    Hematocrit  38.5 % 34.0 - 46.6 22 0949    Platelets  265 10*3/mm3 140 - 450 22 0949    Rubella IgG ^ Immune   10/17/19     Hepatitis B SAg ^ Negative   10/17/19     Hepatitis C Ab ^ Non-Reactive   10/17/19     RPR ^ Non-Reactive   10/17/19     ABO  A   22 0949    Rh  Positive   22 0949    Antibody Screen  Negative   22 0949    HIV ^ Negative   10/17/19     Urine Culture  >100,000 CFU/mL Mixed Gram Positive Jessica   20 2312    Gonorrhea        Chlamydia        TSH        HgB A1c               2nd and 3rd Trimester     Test Value Reference Range Date Time    Hemoglobin (repeated)        Hematocrit  (repeated)        Platelets   265 10*3/mm3 140 - 450 04/06/22 0949    GCT        Antibody Screen (repeated)        GTT Fasting        GTT 1 Hr        GTT 2 Hr        GTT 3 Hr        Group B Strep              Drug Screening     Test Value Reference Range Date Time    Amphetamine Screen        Barbiturate Screen        Benzodiazepine Screen        Methadone Screen        Phencyclidine Screen        Opiates Screen        THC Screen        Cocaine Screen        Propoxyphene Screen        Buprenorphine Screen        Methamphetamine Screen        Oxycodone Screen        Tricyclic Antidepressants Screen              Other (Risk screening)     Test Value Reference Range Date Time    Varicella IgG        Parvovirus IgG        CMV IgG        Cystic Fibrosis        Hemoglobin electrophoresis        NIPT        MSAFP-4        AFP (for NTD only)              Legend    ^: Historical                      External Prenatal Results     Pregnancy Outside Results - Transcribed From Office Records - See Scanned Records For Details     Test Value Date Time    ABO  A  04/06/22 0949    Rh  Positive  04/06/22 0949    Antibody Screen  Negative  04/06/22 0949    Varicella IgG       Rubella ^ Immune  10/17/19     Hgb  13.4 g/dL 04/06/22 0949    Hct  38.5 % 04/06/22 0949    Glucose Fasting GTT       Glucose Tolerance Test 1 hour       Glucose Tolerance Test 3 hour       Gonorrhea (discrete)       Chlamydia (discrete)       RPR ^ Non-Reactive  10/17/19     VDRL       Syphilis Antibody       HBsAg ^ Negative  10/17/19     Herpes Simplex Virus PCR       Herpes Simplex VIrus Culture       HIV ^ Negative  10/17/19     Hep C RNA Quant PCR       Hep C Antibody ^ Non-Reactive  10/17/19     AFP       Group B Strep ^ Negative  05/11/20     GBS Susceptibility to Clindamycin       GBS Susceptibility to Erythromycin       Fetal Fibronectin  Negative  08/12/22 1713    Genetic Testing, Maternal Blood             Drug Screening     Test Value Date Time     Urine Drug Screen       Amphetamine Screen       Barbiturate Screen       Benzodiazepine Screen       Methadone Screen       Phencyclidine Screen       Opiates Screen       THC Screen       Cocaine Screen       Propoxyphene Screen       Buprenorphine Screen       Methamphetamine Screen       Oxycodone Screen       Tricyclic Antidepressants Screen             Legend    ^: Historical                         Past OB History:     OB History    Para Term  AB Living   5 1 1 0 3 1   SAB IAB Ectopic Molar Multiple Live Births   3 0 0 0 0 1      # Outcome Date GA Lbr Chandu/2nd Weight Sex Delivery Anes PTL Lv   5 Current            4 SAB 21     SAB      3 SAB 21     SAB      2 Term 20 39w1d 04:05 / 02:13 3294 g (7 lb 4.2 oz) F Vag-Spont EPI N NGOC      Birth Comments: scale 1      Name: YVROSE BROWN      Apgar1: 8  Apgar5: 9   1 SAB 19 10w0d              Past Medical History: Past Medical History:   Diagnosis Date   • Anxiety     had short term treatmeny    • COVID-19 virus infection 2020   • Environmental allergies    • GERD (gastroesophageal reflux disease)    • Gestational diabetes     Insulin required   • Gestational hypertension     elevated for 1 week   • Hypoglycemia    • Miscarriage    • Multiple gestation    • Retained placental fragment       Past Surgical History Past Surgical History:   Procedure Laterality Date   • D & C HYSTEROSCOPY N/A 2021    Procedure: HYSTEROSCOPY  DILATATION AND CURETTAGE WITH MYOSURE;  Surgeon: Kathy Bai MD;  Location: [facility];  Service: Obstetrics/Gynecology;  Laterality: N/A;   • D & C WITH SUCTION N/A 2019    Procedure: SUCTION DILATATION AND CURETTAGE;  Surgeon: Kathy Bai MD;  Location: [facility];  Service: Obstetrics/Gynecology   • D & C WITH SUCTION  2021    IN OFFICE WOMEN FIRST    • EAR TUBES     • TONSILLECTOMY        Family History: Family History   Problem Relation Age of Onset   • Migraines  Mother    • No Known Problems Father    • No Known Problems Sister    • Malig Hyperthermia Neg Hx       Social History:  reports that she has never smoked. She has never used smokeless tobacco.   reports no history of alcohol use.   reports no history of drug use.        General ROS:     Review of Systems   Constitutional: Positive for appetite change. Negative for fever.   Eyes: Negative.  Negative for visual disturbance.   Respiratory: Negative.  Negative for chest tightness and shortness of breath.    Cardiovascular: Negative.  Negative for chest pain and palpitations.   Gastrointestinal: Positive for abdominal pain and nausea. Negative for vomiting.   Genitourinary: Positive for difficulty urinating and frequency. Negative for flank pain, vaginal bleeding and vaginal discharge.   Musculoskeletal: Positive for back pain.   Skin: Positive for pallor.   Neurological: Positive for headaches. Negative for light-headedness.       Objective       Vital Signs Range for the last 24 hours  Temperature: Temp:  [98.1 °F (36.7 °C)] 98.1 °F (36.7 °C)   Temp Source: Temp src: Oral   BP: BP: (97)/(57) 97/57   Pulse: Heart Rate:  [113-118] 113   Respirations: Resp:  [18] 18   SPO2: SpO2:  [100 %] 100 %     Physical Examination:     General: well developed; well nourished  no acute distress   Abdomen: soft, non-tender; no masses   FHT's: reassuring, reactive and category 1      Cervix: was checked (by RN): 1 cm / 50 % / -3   Presentation: n/a   Contractions: none   Back: CVA tenderness is absent bilateral      Presentation: n/a   Cervix: Method: sterile exam per RN   Dilation: Cervical Dilation (cm): 1   Effacement: Cervical Effacement: 50%   Station:         Fetal Heart Rate Assessment   Method:external      Beats/min:135-140     Baseline:     Variability:moderate     Accels:+     Decels:neg     Tracing Category:  cat 1     Uterine Assessment   Method:ext     Frequency (min):rare     Ctx Count in 10 min:     Duration:      Intensity:     [place] Units:       GBS is unknown      Assessment & Plan       * No active hospital problems. *        Assessment:  1.  Twin gestation  at 33w0d gestation with reassuring fetal status.    2.  pelvic pain secondary to gross hematuria  3.  Obstetrical history significant for is non-contributory.  4.  GBS status: No results found for: STREPGPB    Plan:  1. Admit for IVF and antibiotics, renal ultrasound  2.  Plan of care has been reviewed with patient and patient agrees.   3.  Risks, benefits of treatment plan have been discussed.  4.  All questions have been answered.  5. Discussed case with Dr. Eldridge who agrees with admission.         Electronically signed by Sushila Hackett MD, 09/04/22, 8:18 AM EDT.

## 2022-09-04 NOTE — H&P
[facility]  Obstetric History and Physical    Patient Name: Xochilt Oreilly  :  1990  MRN:  [id number]        Chief Complaint   Patient presents with   • Abdominal Cramping     Jose to ld for c/o constant lower abd pain that started at 0600, pt rates pain 9, pt also has bloody urine noted on admission, pt reports first time she has noticed urine. Denies leakage of fluids, reports babies active       Subjective     Patient is a 32 y.o. female  currently at 33w0d, who presents with sudden onset of severe suprapubic pain at 0600.  She has had no prior incidents.  No ctx, vb.  Noted urine was pink/dark. No flank pain. no fevers.  Her pnc has been c/b well controlled gdm-a2 on levimir nightly, twins di/di (last cx exam was 1cm) and coral in early pregnancy.  she had a previous tsvd.      Past Medical History: Past Medical History:   Diagnosis Date   • Anxiety     had short term treatmeny    • COVID-19 virus infection 2020   • Environmental allergies    • GERD (gastroesophageal reflux disease)    • Gestational diabetes     Insulin required   • Gestational hypertension     first pregnancy   • Hypoglycemia    • Miscarriage    • Multiple gestation    • Retained placental fragment       Past Surgical History Past Surgical History:   Procedure Laterality Date   • D & C HYSTEROSCOPY N/A 2021    Procedure: HYSTEROSCOPY  DILATATION AND CURETTAGE WITH MYOSURE;  Surgeon: Kathy Bai MD;  Location: [facility];  Service: Obstetrics/Gynecology;  Laterality: N/A;   • D & C WITH SUCTION N/A 2019    Procedure: SUCTION DILATATION AND CURETTAGE;  Surgeon: Kathy Bai MD;  Location: [facility];  Service: Obstetrics/Gynecology   • D & C WITH SUCTION  2021    IN OFFICE WOMEN FIRST    • EAR TUBES     • TONSILLECTOMY        Family History: Family History   Problem Relation Age of Onset   • Migraines Mother    • No Known Problems Father    • No Known Problems Sister    • Malig Hyperthermia  Neg Hx       Social History:  reports that she has never smoked. She has never used smokeless tobacco.   reports no history of alcohol use.   reports no history of drug use.    Allergies:     Shellfish-derived products, Norco [hydrocodone-acetaminophen], and Septra [sulfamethoxazole-trimethoprim]     Past OB History:       OB History    Para Term  AB Living   5 1 1 0 3 1   SAB IAB Ectopic Molar Multiple Live Births   3 0 0 0 0 1      # Outcome Date GA Lbr Chandu/2nd Weight Sex Delivery Anes PTL Lv   5 Current            4 SAB 21     SAB      3 SAB 21     SAB      2 Term 20 39w1d 04:05  02:13 3294 g (7 lb 4.2 oz) F Vag-Spont EPI N NGOC      Birth Comments: scale 1      Name: YVROSE BROWN      Apgar1: 8  Apgar5: 9   1 SAB 19 10w0d                Objective       Vital Signs Range for the last 24 hours  Temperature: Temp:  [97.7 °F (36.5 °C)-98.4 °F (36.9 °C)] 97.7 °F (36.5 °C)   Temp Source: Temp src: Oral   BP: BP: ()/(57-64) 102/62   Pulse: Heart Rate:  [] 109   Respirations: Resp:  [16-18] 16             Physical Exam:        General :    Alert and cooperative in NAD   Lungs:   Clear to auscultation bilaterally   CV:   Regular rate and rhythm   Abdomen:  Gravid, non-tender, no masses   Vaginal exam: cx 1cm per dr acosta     LE:   No edema     FHR:   130/140, nl ltv, no decels   Wolf Trap:   Occasional, rare ctx              A/P:      33wk  twin gestation with severe pelvic pain.  Her ua did show large blood, ketones, spec grav of 1.22.. her wbc is 13.  She is af.  dw pt and fob differential diagnosis at length. dw likely nephrolithiasis.  Pt initially was in severe pain.. that has subsided greatly with 2 of stadol, 8 of zofran and ivfs.  Renal sono done and essentially nl. Urine cx pending.  She did get rocephin on admission given her level of discomfort.  dw may need to consult urology if she doesn't improve.  No signs of ptl. Cervix exam is unchanged. FSR  x2    Problem List Items Addressed This Visit    None                 Ruba Eldridge MD  9/4/2022  18:42 EDT

## 2022-09-05 VITALS
TEMPERATURE: 98.1 F | DIASTOLIC BLOOD PRESSURE: 67 MMHG | HEIGHT: 67 IN | HEART RATE: 105 BPM | OXYGEN SATURATION: 96 % | WEIGHT: 208 LBS | RESPIRATION RATE: 18 BRPM | BODY MASS INDEX: 32.65 KG/M2 | SYSTOLIC BLOOD PRESSURE: 116 MMHG

## 2022-09-05 LAB
BACTERIA SPEC AEROBE CULT: NORMAL
GLUCOSE BLDC GLUCOMTR-MCNC: 90 MG/DL (ref 70–130)
GLUCOSE BLDC GLUCOMTR-MCNC: 91 MG/DL (ref 70–130)

## 2022-09-05 PROCEDURE — 59025 FETAL NON-STRESS TEST: CPT

## 2022-09-05 PROCEDURE — 25010000002 CEFTRIAXONE PER 250 MG: Performed by: OBSTETRICS & GYNECOLOGY

## 2022-09-05 PROCEDURE — 96365 THER/PROPH/DIAG IV INF INIT: CPT

## 2022-09-05 PROCEDURE — 96361 HYDRATE IV INFUSION ADD-ON: CPT

## 2022-09-05 PROCEDURE — 82962 GLUCOSE BLOOD TEST: CPT

## 2022-09-05 RX ORDER — NITROFURANTOIN 25; 75 MG/1; MG/1
100 CAPSULE ORAL 2 TIMES DAILY
Qty: 6 CAPSULE | Refills: 0 | Status: SHIPPED | OUTPATIENT
Start: 2022-09-05 | End: 2022-09-10

## 2022-09-05 RX ADMIN — CEFTRIAXONE SODIUM 1 G: 1 INJECTION, POWDER, FOR SOLUTION INTRAMUSCULAR; INTRAVENOUS at 11:20

## 2022-09-05 RX ADMIN — SODIUM CHLORIDE 125 ML/HR: 9 INJECTION, SOLUTION INTRAVENOUS at 03:26

## 2022-09-05 NOTE — NON STRESS TEST
carlie Mcguire  at 33w1d with an RENAN of 10/23/2022, by Patient Reported, was seen at [facility] LABOR DELIVERY for a nonstress test.    Chief Complaint   Patient presents with   • Abdominal Cramping     Jose to ld for c/o constant lower abd pain that started at 0600, pt rates pain 9, pt also has bloody urine noted on admission, pt reports first time she has noticed urine. Denies leakage of fluids, reports babies active       Patient Active Problem List   Diagnosis   • Gestational hypertension without significant proteinuria in third trimester   • Pregnancy   • Gestational hypertension   • Hematuria due to acute cystitis       Start Time: 1014  Stop Time: 1043    Interpretation A  Nonstress Test Interpretation A: Reactive  Interpretation B  Nonstress Test Interpretation B: Reactive

## 2022-09-05 NOTE — NON STRESS TEST
carlie Mcguire  at 33w0d with an RENAN of 10/23/2022, by Patient Reported, was seen at [facility] LABOR DELIVERY for a nonstress test.    Chief Complaint   Patient presents with   • Abdominal Cramping     Jose to ld for c/o constant lower abd pain that started at 0600, pt rates pain 9, pt also has bloody urine noted on admission, pt reports first time she has noticed urine. Denies leakage of fluids, reports babies active       Patient Active Problem List   Diagnosis   • Gestational hypertension without significant proteinuria in third trimester   • Pregnancy   • Gestational hypertension   • Hematuria due to acute cystitis       Start Time:19:23  Stop Time: 19:50    Interpretation A  Nonstress Test Interpretation A: Reactive  Interpretation B  Nonstress Test Interpretation B: Reactive

## 2022-09-05 NOTE — DISCHARGE SUMMARY
[facility]  Obstetric Progress Note    Patient Name: Xochilt Oreilly  :  1990  MRN:  [id number]  Hospital Day: 1  EGA: 33w1d      Subjective     Feeling better now.  Urine appearance normal.  No contractions.    Objective     VS:  Vital Signs Range for the last 24 hours  Temperature: Temp:  [97.7 °F (36.5 °C)-98.2 °F (36.8 °C)] 98.2 °F (36.8 °C)   Temp Source: Temp src: Oral   BP: BP: ()/(59-67) 116/67   Pulse: Heart Rate:  [] 105   Respirations: Resp:  [16-18] 18                   Physical Examination:     General :  Alert in NAD  Abdomen: Gravid, Fundus - NT       Lab results reviewed:  CBC:   Lab Results   Component Value Date    WBC 13.74 (H) 2022    HGB 11.6 (L) 2022    HCT 33.9 (L) 2022            A/P:      Hematuria due to acute cystitis    UC returns complete with no infection but contaminant noted.  Will give a second dose of Rocephin but I am not certain that this is infection but maybe more like a stone?  Will send oral antibiotics but can discuss whether oral is indicated at office visit tomorrow.  Home now.          Marleni Tamayo MD  2022  10:09 EDT

## 2022-09-05 NOTE — PLAN OF CARE
Problem: Adult Inpatient Plan of Care  Goal: Plan of Care Review  Outcome: Met  Goal: Patient-Specific Goal (Individualized)  9/5/2022 1129 by Marie Kapoor RN  Outcome: Met  9/4/2022 2238 by Marie Kapoor RN  Flowsheets (Taken 9/4/2022 2238)  Patient-Specific Goals (Include Timeframe): Patient uncomfortable with bed, waffle pillow to improve buttocks discomfort and  Individualized Care Needs: promote comfort by improving environmnt  Anxieties, Fears or Concerns: pain  Goal: Absence of Hospital-Acquired Illness or Injury  Outcome: Met  Intervention: Identify and Manage Fall Risk  Recent Flowsheet Documentation  Taken 9/4/2022 2200 by Marie Kapoor, RN  Safety Promotion/Fall Prevention: safety round/check completed  Goal: Optimal Comfort and Wellbeing  Outcome: Met  Goal: Readiness for Transition of Care  Outcome: Met   Goal Outcome Evaluation:

## 2022-09-05 NOTE — PLAN OF CARE
"  Problem: Adult Inpatient Plan of Care  Goal: Plan of Care Review  Outcome: Ongoing, Progressing  Flowsheets (Taken 9/5/2022 0654)  Progress: improving  Outcome Evaluation: Appropriate Fetal Kick count for Baby A & B, pt denied complaints throughout the night. + FM, denies LOF, VB, pain besides her \"normal back aches.\" Fasting blood glucose 90. Urine strained, no sediment or stones noted.     Problem: Adult Inpatient Plan of Care  Goal: Patient-Specific Goal (Individualized)  Outcome: Ongoing, Progressing  Flowsheets  Taken 9/5/2022 0654 by Kristan Andrade, RN  Patient-Specific Goals (Include Timeframe): Discharge home today.  Anxieties, Fears or Concerns: Prolonged hospital stay.  Taken 9/4/2022 2238 by Marie Kapoor, RN  Individualized Care Needs: promote comfort by improving environmnt   Goal Outcome Evaluation:           Progress: improving  Outcome Evaluation: Appropriate Fetal Kick count for Baby A & B, pt denied complaints throughout the night. + FM, denies LOF, VB, pain besides her \"normal back aches.\" Fasting blood glucose 90. Urine strained, no sediment or stones noted.  "

## 2022-09-05 NOTE — PLAN OF CARE
Problem: Adult Inpatient Plan of Care  Goal: Patient-Specific Goal (Individualized)  Flowsheets (Taken 9/4/2022 2238)  Patient-Specific Goals (Include Timeframe): Patient uncomfortable with bed, waffle pillow to improve buttocks discomfort and  Individualized Care Needs: promote comfort by improving environmnt  Anxieties, Fears or Concerns: pain  Goal: Absence of Hospital-Acquired Illness or Injury  Intervention: Identify and Manage Fall Risk  Recent Flowsheet Documentation  Taken 9/4/2022 2200 by Marie Kapoor RN  Safety Promotion/Fall Prevention: safety round/check completed  Taken 9/4/2022 2000 by Marie Kapoor RN  Safety Promotion/Fall Prevention: safety round/check completed  Taken 9/4/2022 1420 by Marie Kapoor RN  Safety Promotion/Fall Prevention: safety round/check completed  Taken 9/4/2022 1200 by Marie Kapoor RN  Safety Promotion/Fall Prevention: safety round/check completed  Goal: Optimal Comfort and Wellbeing  Intervention: Provide Person-Centered Care  Recent Flowsheet Documentation  Taken 9/4/2022 1420 by Marie Kapoor RN  Trust Relationship/Rapport:   care explained   questions answered   questions encouraged   Goal Outcome Evaluation:

## 2022-10-03 ENCOUNTER — ANESTHESIA (OUTPATIENT)
Dept: LABOR AND DELIVERY | Facility: HOSPITAL | Age: 32
End: 2022-10-03

## 2022-10-03 ENCOUNTER — HOSPITAL ENCOUNTER (INPATIENT)
Facility: HOSPITAL | Age: 32
LOS: 4 days | Discharge: HOME | End: 2022-10-07
Attending: OBSTETRICS & GYNECOLOGY | Admitting: OBSTETRICS & GYNECOLOGY

## 2022-10-03 ENCOUNTER — ANESTHESIA EVENT (OUTPATIENT)
Dept: LABOR AND DELIVERY | Facility: HOSPITAL | Age: 32
End: 2022-10-03

## 2022-10-03 DIAGNOSIS — Z98.891 STATUS POST CESAREAN DELIVERY: ICD-10-CM

## 2022-10-03 DIAGNOSIS — O30.049 TWIN PREGNANCY, DICHORIONIC/DIAMNIOTIC, UNSPECIFIED TRIMESTER: Primary | ICD-10-CM

## 2022-10-03 DIAGNOSIS — Z30.2 REQUEST FOR STERILIZATION: ICD-10-CM

## 2022-10-03 LAB
ABO GROUP BLD: NORMAL
ALBUMIN SERPL-MCNC: 3.2 G/DL (ref 3.5–5.2)
ALBUMIN/GLOB SERPL: 1.3 G/DL
ALP SERPL-CCNC: 195 U/L (ref 39–117)
ALT SERPL W P-5'-P-CCNC: 13 U/L (ref 1–33)
ANION GAP SERPL CALCULATED.3IONS-SCNC: 10.5 MMOL/L (ref 5–15)
AST SERPL-CCNC: 19 U/L (ref 1–32)
BILIRUB SERPL-MCNC: 0.2 MG/DL (ref 0–1.2)
BLD GP AB SCN SERPL QL: NEGATIVE
BUN SERPL-MCNC: 12 MG/DL (ref 6–20)
BUN/CREAT SERPL: 27.3 (ref 7–25)
CALCIUM SPEC-SCNC: 8.6 MG/DL (ref 8.6–10.5)
CHLORIDE SERPL-SCNC: 101 MMOL/L (ref 98–107)
CO2 SERPL-SCNC: 20.5 MMOL/L (ref 22–29)
CREAT SERPL-MCNC: 0.44 MG/DL (ref 0.57–1)
DEPRECATED RDW RBC AUTO: 44.5 FL (ref 37–54)
EGFRCR SERPLBLD CKD-EPI 2021: 131.2 ML/MIN/1.73
ERYTHROCYTE [DISTWIDTH] IN BLOOD BY AUTOMATED COUNT: 13.8 % (ref 12.3–15.4)
GLOBULIN UR ELPH-MCNC: 2.5 GM/DL
GLUCOSE SERPL-MCNC: 81 MG/DL (ref 65–99)
HCT VFR BLD AUTO: 37.2 % (ref 34–46.6)
HGB BLD-MCNC: 12.4 G/DL (ref 12–15.9)
MCH RBC QN AUTO: 29.8 PG (ref 26.6–33)
MCHC RBC AUTO-ENTMCNC: 33.3 G/DL (ref 31.5–35.7)
MCV RBC AUTO: 89.4 FL (ref 79–97)
PLATELET # BLD AUTO: 204 10*3/MM3 (ref 140–450)
PMV BLD AUTO: 12.7 FL (ref 6–12)
POTASSIUM SERPL-SCNC: 3.8 MMOL/L (ref 3.5–5.2)
PROT SERPL-MCNC: 5.7 G/DL (ref 6–8.5)
RBC # BLD AUTO: 4.16 10*6/MM3 (ref 3.77–5.28)
RH BLD: POSITIVE
SODIUM SERPL-SCNC: 132 MMOL/L (ref 136–145)
T&S EXPIRATION DATE: NORMAL
WBC NRBC COR # BLD: 10.34 10*3/MM3 (ref 3.4–10.8)

## 2022-10-03 PROCEDURE — 88307 TISSUE EXAM BY PATHOLOGIST: CPT

## 2022-10-03 PROCEDURE — 85027 COMPLETE CBC AUTOMATED: CPT | Performed by: OBSTETRICS & GYNECOLOGY

## 2022-10-03 PROCEDURE — 25010000002 FENTANYL CITRATE (PF) 50 MCG/ML SOLUTION: Performed by: ANESTHESIOLOGY

## 2022-10-03 PROCEDURE — 86850 RBC ANTIBODY SCREEN: CPT | Performed by: OBSTETRICS & GYNECOLOGY

## 2022-10-03 PROCEDURE — 86901 BLOOD TYPING SEROLOGIC RH(D): CPT | Performed by: OBSTETRICS & GYNECOLOGY

## 2022-10-03 PROCEDURE — 25010000002 MORPHINE PER 10 MG: Performed by: ANESTHESIOLOGY

## 2022-10-03 PROCEDURE — 25010000002 METHYLERGONOVINE MALEATE PER 0.2 MG: Performed by: OBSTETRICS & GYNECOLOGY

## 2022-10-03 PROCEDURE — 25010000002 ONDANSETRON PER 1 MG: Performed by: ANESTHESIOLOGY

## 2022-10-03 PROCEDURE — 86900 BLOOD TYPING SEROLOGIC ABO: CPT | Performed by: OBSTETRICS & GYNECOLOGY

## 2022-10-03 PROCEDURE — 25010000002 ONDANSETRON PER 1 MG: Performed by: NURSE ANESTHETIST, CERTIFIED REGISTERED

## 2022-10-03 PROCEDURE — 88302 TISSUE EXAM BY PATHOLOGIST: CPT | Performed by: OBSTETRICS & GYNECOLOGY

## 2022-10-03 PROCEDURE — 25010000002 OXYTOCIN PER 10 UNITS: Performed by: OBSTETRICS & GYNECOLOGY

## 2022-10-03 PROCEDURE — 25010000002 CEFAZOLIN IN DEXTROSE 2-4 GM/100ML-% SOLUTION: Performed by: OBSTETRICS & GYNECOLOGY

## 2022-10-03 PROCEDURE — 25010000002 KETOROLAC TROMETHAMINE PER 15 MG: Performed by: NURSE ANESTHETIST, CERTIFIED REGISTERED

## 2022-10-03 PROCEDURE — 80053 COMPREHEN METABOLIC PANEL: CPT | Performed by: OBSTETRICS & GYNECOLOGY

## 2022-10-03 PROCEDURE — 0UB70ZZ EXCISION OF BILATERAL FALLOPIAN TUBES, OPEN APPROACH: ICD-10-PCS | Performed by: OBSTETRICS & GYNECOLOGY

## 2022-10-03 PROCEDURE — 25010000002 KETOROLAC TROMETHAMINE PER 15 MG: Performed by: OBSTETRICS & GYNECOLOGY

## 2022-10-03 RX ORDER — LIDOCAINE HYDROCHLORIDE 10 MG/ML
5 INJECTION, SOLUTION EPIDURAL; INFILTRATION; INTRACAUDAL; PERINEURAL AS NEEDED
Status: DISCONTINUED | OUTPATIENT
Start: 2022-10-03 | End: 2022-10-03 | Stop reason: HOSPADM

## 2022-10-03 RX ORDER — IBUPROFEN 600 MG/1
600 TABLET ORAL EVERY 6 HOURS
Status: DISCONTINUED | OUTPATIENT
Start: 2022-10-05 | End: 2022-10-04

## 2022-10-03 RX ORDER — ONDANSETRON 2 MG/ML
4 INJECTION INTRAMUSCULAR; INTRAVENOUS ONCE AS NEEDED
Status: COMPLETED | OUTPATIENT
Start: 2022-10-03 | End: 2022-10-03

## 2022-10-03 RX ORDER — BUPIVACAINE HCL/0.9 % NACL/PF 0.125 %
PLASTIC BAG, INJECTION (ML) EPIDURAL AS NEEDED
Status: DISCONTINUED | OUTPATIENT
Start: 2022-10-03 | End: 2022-10-03 | Stop reason: SURG

## 2022-10-03 RX ORDER — MISOPROSTOL 200 UG/1
800 TABLET ORAL ONCE AS NEEDED
Status: DISCONTINUED | OUTPATIENT
Start: 2022-10-03 | End: 2022-10-03 | Stop reason: HOSPADM

## 2022-10-03 RX ORDER — FAMOTIDINE 10 MG/ML
20 INJECTION, SOLUTION INTRAVENOUS ONCE
Status: DISCONTINUED | OUTPATIENT
Start: 2022-10-03 | End: 2022-10-03

## 2022-10-03 RX ORDER — SIMETHICONE 80 MG
80 TABLET,CHEWABLE ORAL 4 TIMES DAILY PRN
Status: DISCONTINUED | OUTPATIENT
Start: 2022-10-03 | End: 2022-10-07 | Stop reason: HOSPADM

## 2022-10-03 RX ORDER — IBUPROFEN 800 MG/1
400 TABLET ORAL EVERY 6 HOURS PRN
Status: ACTIVE | OUTPATIENT
Start: 2022-10-03 | End: 2022-10-04

## 2022-10-03 RX ORDER — BUPIVACAINE HYDROCHLORIDE 7.5 MG/ML
INJECTION, SOLUTION EPIDURAL; RETROBULBAR
Status: COMPLETED | OUTPATIENT
Start: 2022-10-03 | End: 2022-10-03

## 2022-10-03 RX ORDER — OXYTOCIN-SODIUM CHLORIDE 0.9% IV SOLN 30 UNIT/500ML 30-0.9/5 UT/ML-%
250 SOLUTION INTRAVENOUS CONTINUOUS
Status: DISPENSED | OUTPATIENT
Start: 2022-10-03 | End: 2022-10-03

## 2022-10-03 RX ORDER — OXYCODONE HYDROCHLORIDE 5 MG/1
5 TABLET ORAL EVERY 4 HOURS PRN
Status: DISCONTINUED | OUTPATIENT
Start: 2022-10-03 | End: 2022-10-07 | Stop reason: HOSPADM

## 2022-10-03 RX ORDER — OXYTOCIN-SODIUM CHLORIDE 0.9% IV SOLN 30 UNIT/500ML 30-0.9/5 UT/ML-%
125 SOLUTION INTRAVENOUS CONTINUOUS PRN
Status: COMPLETED | OUTPATIENT
Start: 2022-10-03 | End: 2022-10-03

## 2022-10-03 RX ORDER — ONDANSETRON 4 MG/1
4 TABLET, FILM COATED ORAL EVERY 8 HOURS PRN
Status: DISCONTINUED | OUTPATIENT
Start: 2022-10-03 | End: 2022-10-07 | Stop reason: HOSPADM

## 2022-10-03 RX ORDER — ERYTHROMYCIN 5 MG/G
OINTMENT OPHTHALMIC
Status: ACTIVE
Start: 2022-10-03 | End: 2022-10-03

## 2022-10-03 RX ORDER — KETOROLAC TROMETHAMINE 30 MG/ML
30 INJECTION, SOLUTION INTRAMUSCULAR; INTRAVENOUS ONCE
Status: DISCONTINUED | OUTPATIENT
Start: 2022-10-03 | End: 2022-10-03 | Stop reason: HOSPADM

## 2022-10-03 RX ORDER — SODIUM CHLORIDE 0.9 % (FLUSH) 0.9 %
10 SYRINGE (ML) INJECTION AS NEEDED
Status: DISCONTINUED | OUTPATIENT
Start: 2022-10-03 | End: 2022-10-03 | Stop reason: HOSPADM

## 2022-10-03 RX ORDER — ACETAMINOPHEN 325 MG/1
650 TABLET ORAL EVERY 4 HOURS PRN
Status: ACTIVE | OUTPATIENT
Start: 2022-10-03 | End: 2022-10-04

## 2022-10-03 RX ORDER — OXYCODONE HYDROCHLORIDE 10 MG/1
10 TABLET ORAL EVERY 4 HOURS PRN
Status: DISCONTINUED | OUTPATIENT
Start: 2022-10-03 | End: 2022-10-07 | Stop reason: HOSPADM

## 2022-10-03 RX ORDER — METHYLERGONOVINE MALEATE 0.2 MG/ML
200 INJECTION INTRAVENOUS ONCE AS NEEDED
Status: COMPLETED | OUTPATIENT
Start: 2022-10-03 | End: 2022-10-03

## 2022-10-03 RX ORDER — DIPHENHYDRAMINE HCL 25 MG
25 CAPSULE ORAL EVERY 4 HOURS PRN
Status: DISCONTINUED | OUTPATIENT
Start: 2022-10-03 | End: 2022-10-07 | Stop reason: HOSPADM

## 2022-10-03 RX ORDER — EPHEDRINE SULFATE 50 MG/ML
INJECTION INTRAVENOUS AS NEEDED
Status: DISCONTINUED | OUTPATIENT
Start: 2022-10-03 | End: 2022-10-03 | Stop reason: SURG

## 2022-10-03 RX ORDER — ACETAMINOPHEN 500 MG
1000 TABLET ORAL EVERY 6 HOURS
Status: DISPENSED | OUTPATIENT
Start: 2022-10-03 | End: 2022-10-04

## 2022-10-03 RX ORDER — DOCUSATE SODIUM 100 MG/1
100 CAPSULE, LIQUID FILLED ORAL 2 TIMES DAILY PRN
Status: DISCONTINUED | OUTPATIENT
Start: 2022-10-03 | End: 2022-10-07 | Stop reason: HOSPADM

## 2022-10-03 RX ORDER — SODIUM CHLORIDE 0.9 % (FLUSH) 0.9 %
10 SYRINGE (ML) INJECTION EVERY 12 HOURS SCHEDULED
Status: DISCONTINUED | OUTPATIENT
Start: 2022-10-03 | End: 2022-10-03 | Stop reason: HOSPADM

## 2022-10-03 RX ORDER — ACETAMINOPHEN 325 MG/1
650 TABLET ORAL EVERY 6 HOURS
Status: DISCONTINUED | OUTPATIENT
Start: 2022-10-04 | End: 2022-10-07 | Stop reason: HOSPADM

## 2022-10-03 RX ORDER — DOCUSATE SODIUM 100 MG/1
CAPSULE, LIQUID FILLED ORAL
COMMUNITY
End: 2022-10-07 | Stop reason: HOSPADM

## 2022-10-03 RX ORDER — SODIUM CHLORIDE, SODIUM LACTATE, POTASSIUM CHLORIDE, CALCIUM CHLORIDE 600; 310; 30; 20 MG/100ML; MG/100ML; MG/100ML; MG/100ML
125 INJECTION, SOLUTION INTRAVENOUS CONTINUOUS
Status: DISCONTINUED | OUTPATIENT
Start: 2022-10-03 | End: 2022-10-03

## 2022-10-03 RX ORDER — DIPHENHYDRAMINE HYDROCHLORIDE 50 MG/ML
25 INJECTION INTRAMUSCULAR; INTRAVENOUS EVERY 4 HOURS PRN
Status: DISCONTINUED | OUTPATIENT
Start: 2022-10-03 | End: 2022-10-07 | Stop reason: HOSPADM

## 2022-10-03 RX ORDER — CETIRIZINE HYDROCHLORIDE 10 MG/1
10 TABLET ORAL DAILY
Status: DISCONTINUED | OUTPATIENT
Start: 2022-10-03 | End: 2022-10-03

## 2022-10-03 RX ORDER — CARBOPROST TROMETHAMINE 250 UG/ML
250 INJECTION, SOLUTION INTRAMUSCULAR
Status: DISCONTINUED | OUTPATIENT
Start: 2022-10-03 | End: 2022-10-03 | Stop reason: HOSPADM

## 2022-10-03 RX ORDER — PHYTONADIONE 1 MG/.5ML
INJECTION, EMULSION INTRAMUSCULAR; INTRAVENOUS; SUBCUTANEOUS
Status: ACTIVE
Start: 2022-10-03 | End: 2022-10-03

## 2022-10-03 RX ORDER — KETOROLAC TROMETHAMINE 30 MG/ML
INJECTION, SOLUTION INTRAMUSCULAR; INTRAVENOUS AS NEEDED
Status: DISCONTINUED | OUTPATIENT
Start: 2022-10-03 | End: 2022-10-03 | Stop reason: SURG

## 2022-10-03 RX ORDER — ACETAMINOPHEN 500 MG
1000 TABLET ORAL ONCE
Status: COMPLETED | OUTPATIENT
Start: 2022-10-03 | End: 2022-10-03

## 2022-10-03 RX ORDER — HYDROMORPHONE HYDROCHLORIDE 1 MG/ML
0.5 INJECTION, SOLUTION INTRAMUSCULAR; INTRAVENOUS; SUBCUTANEOUS
Status: ACTIVE | OUTPATIENT
Start: 2022-10-03 | End: 2022-10-04

## 2022-10-03 RX ORDER — ONDANSETRON 2 MG/ML
4 INJECTION INTRAMUSCULAR; INTRAVENOUS ONCE
Status: COMPLETED | OUTPATIENT
Start: 2022-10-03 | End: 2022-10-03

## 2022-10-03 RX ORDER — MORPHINE SULFATE 1 MG/ML
INJECTION, SOLUTION EPIDURAL; INTRATHECAL; INTRAVENOUS
Status: COMPLETED | OUTPATIENT
Start: 2022-10-03 | End: 2022-10-03

## 2022-10-03 RX ORDER — CEFAZOLIN SODIUM 2 G/100ML
2 INJECTION, SOLUTION INTRAVENOUS ONCE
Status: COMPLETED | OUTPATIENT
Start: 2022-10-03 | End: 2022-10-03

## 2022-10-03 RX ORDER — FLUTICASONE PROPIONATE 50 MCG
SPRAY, SUSPENSION (ML) NASAL
COMMUNITY
End: 2022-10-07 | Stop reason: HOSPADM

## 2022-10-03 RX ORDER — ONDANSETRON 2 MG/ML
4 INJECTION INTRAMUSCULAR; INTRAVENOUS ONCE AS NEEDED
Status: DISPENSED | OUTPATIENT
Start: 2022-10-03 | End: 2022-10-04

## 2022-10-03 RX ORDER — PRENATAL VIT/IRON FUM/FOLIC AC 27MG-0.8MG
1 TABLET ORAL DAILY
Status: DISCONTINUED | OUTPATIENT
Start: 2022-10-04 | End: 2022-10-07 | Stop reason: HOSPADM

## 2022-10-03 RX ORDER — KETOROLAC TROMETHAMINE 30 MG/ML
30 INJECTION, SOLUTION INTRAMUSCULAR; INTRAVENOUS EVERY 6 HOURS
Status: DISCONTINUED | OUTPATIENT
Start: 2022-10-03 | End: 2022-10-03 | Stop reason: SDUPTHER

## 2022-10-03 RX ORDER — OXYTOCIN-SODIUM CHLORIDE 0.9% IV SOLN 30 UNIT/500ML 30-0.9/5 UT/ML-%
999 SOLUTION INTRAVENOUS ONCE
Status: COMPLETED | OUTPATIENT
Start: 2022-10-03 | End: 2022-10-03

## 2022-10-03 RX ORDER — KETOROLAC TROMETHAMINE 15 MG/ML
15 INJECTION, SOLUTION INTRAMUSCULAR; INTRAVENOUS EVERY 6 HOURS
Status: DISPENSED | OUTPATIENT
Start: 2022-10-03 | End: 2022-10-05

## 2022-10-03 RX ORDER — FAMOTIDINE 10 MG/ML
20 INJECTION, SOLUTION INTRAVENOUS ONCE AS NEEDED
Status: COMPLETED | OUTPATIENT
Start: 2022-10-03 | End: 2022-10-03

## 2022-10-03 RX ORDER — FENTANYL CITRATE 50 UG/ML
INJECTION, SOLUTION INTRAMUSCULAR; INTRAVENOUS
Status: COMPLETED | OUTPATIENT
Start: 2022-10-03 | End: 2022-10-03

## 2022-10-03 RX ORDER — ONDANSETRON 2 MG/ML
4 INJECTION INTRAMUSCULAR; INTRAVENOUS EVERY 6 HOURS PRN
Status: DISCONTINUED | OUTPATIENT
Start: 2022-10-03 | End: 2022-10-07 | Stop reason: HOSPADM

## 2022-10-03 RX ADMIN — Medication 100 MCG: at 11:23

## 2022-10-03 RX ADMIN — SODIUM CHLORIDE, POTASSIUM CHLORIDE, SODIUM LACTATE AND CALCIUM CHLORIDE 1000 ML: 600; 310; 30; 20 INJECTION, SOLUTION INTRAVENOUS at 08:45

## 2022-10-03 RX ADMIN — FAMOTIDINE 20 MG: 10 INJECTION INTRAVENOUS at 10:43

## 2022-10-03 RX ADMIN — METHYLERGONOVINE MALEATE 200 MCG: 0.2 INJECTION INTRAVENOUS at 11:38

## 2022-10-03 RX ADMIN — BUPIVACAINE HYDROCHLORIDE 1.6 ML: 7.5 INJECTION, SOLUTION EPIDURAL; RETROBULBAR at 11:18

## 2022-10-03 RX ADMIN — Medication 1 APPLICATION: at 18:54

## 2022-10-03 RX ADMIN — FENTANYL CITRATE 20 MCG: 0.05 INJECTION, SOLUTION INTRAMUSCULAR; INTRAVENOUS at 11:18

## 2022-10-03 RX ADMIN — OXYTOCIN-SODIUM CHLORIDE 0.9% IV SOLN 30 UNIT/500ML 999 ML/HR: 30-0.9/5 SOLUTION at 11:32

## 2022-10-03 RX ADMIN — Medication 100 MCG: at 11:12

## 2022-10-03 RX ADMIN — SODIUM CHLORIDE, POTASSIUM CHLORIDE, SODIUM LACTATE AND CALCIUM CHLORIDE 125 ML/HR: 600; 310; 30; 20 INJECTION, SOLUTION INTRAVENOUS at 09:40

## 2022-10-03 RX ADMIN — OXYTOCIN 125 ML/HR: 10 INJECTION INTRAVENOUS at 13:05

## 2022-10-03 RX ADMIN — ACETAMINOPHEN 1000 MG: 500 TABLET, FILM COATED ORAL at 22:45

## 2022-10-03 RX ADMIN — ACETAMINOPHEN 1000 MG: 500 TABLET, FILM COATED ORAL at 10:43

## 2022-10-03 RX ADMIN — EPHEDRINE SULFATE 10 MG: 50 INJECTION INTRAVENOUS at 11:29

## 2022-10-03 RX ADMIN — KETOROLAC TROMETHAMINE 30 MG: 30 INJECTION, SOLUTION INTRAMUSCULAR at 11:59

## 2022-10-03 RX ADMIN — Medication 100 MCG: at 11:25

## 2022-10-03 RX ADMIN — Medication 100 MCG: at 11:24

## 2022-10-03 RX ADMIN — ONDANSETRON 4 MG: 2 INJECTION INTRAMUSCULAR; INTRAVENOUS at 12:54

## 2022-10-03 RX ADMIN — KETOROLAC TROMETHAMINE 15 MG: 15 INJECTION, SOLUTION INTRAMUSCULAR; INTRAVENOUS at 18:54

## 2022-10-03 RX ADMIN — MORPHINE SULFATE 150 MCG: 1 INJECTION, SOLUTION EPIDURAL; INTRATHECAL; INTRAVENOUS at 11:18

## 2022-10-03 RX ADMIN — ONDANSETRON 4 MG: 2 INJECTION INTRAMUSCULAR; INTRAVENOUS at 10:43

## 2022-10-03 RX ADMIN — ONDANSETRON 4 MG: 2 INJECTION INTRAMUSCULAR; INTRAVENOUS at 18:54

## 2022-10-03 RX ADMIN — CEFAZOLIN SODIUM 2 G: 2 INJECTION, SOLUTION INTRAVENOUS at 10:59

## 2022-10-03 RX ADMIN — CETIRIZINE HYDROCHLORIDE 10 MG: 10 TABLET ORAL at 10:19

## 2022-10-03 NOTE — LACTATION NOTE
This note was copied from a baby's chart.  Assisted w/latch both twins in cross-cradle, football & laid back hold in which we managed to achieve both twins latched simultaneously with additional help of ARETHA Meier.  Twin B noted to have tongue tie.  Twin A has not had oral assessment by this LC.      Mom has pain even with good latch on left breast likely due to nipple bleb.  Reinforced warm compresses, lanolin & gentle exfoliation as well as latching babies ad cali & hand expression.  Worked with family x1 hour.  Educated parents on positioning at breast & how to achieve deep latch. Babies fed 8-10 mins each.

## 2022-10-03 NOTE — OP NOTE
[facility]   Section Operative Note    Patient Name: Xochilt Oreilly  :  1990  MRN:  [id number]      Date of procedure: 10/3/2022        Pre-Operative Dx:   1.  IUP at 37w1d weeks   2. Dichorionic/diamniotic twins  3. A2GDM  4. Recurrent pregnancy loss  5. Desire for permanent sterilization          Postoperative Dx:  Same        Procedure:   via low transverse incision  Bilateral fimbriectomy     Surgeon: Kathy Bai MD      Assistant: Riley Reddy MD     Anesthesia: spinal   EBL:    Quantitative EBL:  600cc     pending         Specimens: Placenta x2     Findings:                           Amniotic Fluid clear  Placenta Intact, 3 VC  Uterus normal  Tubes and ovaries normal bilaterally              Infant:           Cinthya Oreilly [[id number]]         Mehrdad OreillyfernsViraj ANTONETTE [[id number]]          Gender: Viable male x2      Weight:    Cinthya Oreilly [[id number]]   3150 g (6 lb 15.1 oz)      Oreilly MehrdadBenji WHITMAN [[id number]]   3170 g (6 lb 15.8 oz)       Apgars:    Cinthya Oreilly [[id number]]   8      Mehrdad OreillyvíctorViraj ANTONETTE [[id number]]   8    @ 1 minute /        Cinthya Oreilly [[id number]]   9      Jeevan OreillyViraj WHITMAN [[id number]]   9    @ 5 minutes                 Indication for C/Section:     Twin gestation, declines trial of labor          Procedure Details:  The patient was taken to the operating room where spinal anesthesia was found to be adequate. She was prepped and draped in the usual sterile manner in the dorsal supine position with leftward tilt. A Pfannenstiel incision was made and carried down to the fascia. The fascia was incised in the mid-line and extended transversely with Verduzco scissors. The fascia was grasped and elevated and  from the underlying rectus muscles superiorly and inferiorly. The peritoneum was identified and entered. Peritoneal incision was extended superiorly and inferiorly with good visualization of the bladder. The  bladder blade was inserted and the vesicouterine peritoneum was incised transversely and the bladder flap was created digitally. The bladder blade was reinserted. The  lower uterine segment was incised in a transverse fashion.  The head of baby A was elevated and delivered through the incision. The remainder of the infant was delivered without difficulty. The umbilical cord was clamped and cut and the infant was handed off the field. The head of baby B was then elevated and delivered through the incision followed by the remainder of the body.  The cord was clamped and cut and the infant was handed off the field. The placentas were removed intact and appeared normal. The uterine incision was closed with running locked sutures of 0 chromic. Second layer closure was placed imbricating the first for hemostasis. The abdominal cavity was irrigated and cleared of all clot and debris. The uterine incision was inspected and noted to be hemostatic. Attention was turned first to the right fallopian tube.  The fimbria were clamped and doubly suture ligated. Hemostasis was confirmed and a similar procedure repeated on the left tubal fimbria. Rectus muscles were reapproximated in the midline with 2-0 vicryl.  The fascia was closed with 0 vicryl in running continuous fashion. The subcutaneous tissue was closed with 3-0 vicryl. The skin was closed in subcuticular fashion with 4-0 Vicryl.   Instrument, sponge, and needle counts were correct prior the abdominal closure and at the conclusion of the case. Mother and baby  to recovery room in stable condition.              Complications:     None          Antibiotics: cefazolin (Ancef)                      Kathy Bai MD  10/3/2022  12:26 EDT

## 2022-10-03 NOTE — ANESTHESIA PROCEDURE NOTES
Spinal Block      Patient location during procedure: OR  Performed By  Anesthesiologist: Chuy Jaquez MD  CRNA/CAA: Laura Olsen CRNA  Preanesthetic Checklist  Completed: patient identified, IV checked, site marked, risks and benefits discussed, surgical consent, monitors and equipment checked, pre-op evaluation and timeout performed  Spinal Block Prep:  Patient Position:sitting  Sterile Tech:sterile barriers, mask and gloves  Prep:Chloraprep  Patient Monitoring:blood pressure monitoring  Spinal Block Procedure  Approach:midline  Guidance:landmark technique  Location:L3-L4  Needle Type:Levon  Needle Gauge:25 G  Placement of Spinal needle event:cerebrospinal fluid aspirated  Paresthesia: no  Fluid Appearance:clear  Medications: fentaNYL citrate (PF) (SUBLIMAZE) injection, 20 mcg  Morphine PF injection, 150 mcg  bupivacaine PF (MARCAINE) 0.75 % injection, 1.6 mL   Post Assessment  Patient Tolerance:patient tolerated the procedure well with no apparent complications  Complications no

## 2022-10-03 NOTE — ANESTHESIA PREPROCEDURE EVALUATION
Anesthesia Evaluation     Patient summary reviewed and Nursing notes reviewed   no history of anesthetic complications:  NPO Solid Status: > 8 hours  NPO Liquid Status: > 8 hours           Airway   Mallampati: II  TM distance: >3 FB  Neck ROM: full  No difficulty expected  Dental - normal exam     Pulmonary    (-) sleep apnea, rhonchi, decreased breath sounds, wheezes, not a smoker  Cardiovascular   Exercise tolerance: good (4-7 METS)    Rhythm: regular  Rate: normal    (+) hypertension,   (-) CAD, angina, DALAL, murmur      Neuro/Psych  (-) seizures, CVA  GI/Hepatic/Renal/Endo    (+)  GERD,  diabetes mellitus gestational using insulin,   (-) no renal disease    Musculoskeletal     Abdominal     Abdomen: soft.   Substance History      OB/GYN    (+) Pregnant,         Other                        Anesthesia Plan    ASA 2     spinal     (37w1d)  intravenous induction     Anesthetic plan, risks, benefits, and alternatives have been provided, discussed and informed consent has been obtained with: patient.        CODE STATUS:

## 2022-10-03 NOTE — PLAN OF CARE
Problem: Adult Inpatient Plan of Care  Goal: Plan of Care Review  Outcome: Ongoing, Progressing  Flowsheets (Taken 10/3/2022 1246)  Progress: improving  Plan of Care Reviewed With:   patient   spouse  Outcome Evaluation: Pt delivered twin boys by primary c/s with bilateral tubal. Mom and both babies are doing well at this time, pt is attempting to breastfeed both babies. Plan is to transfer to postpartum after reovery period if vitals, bleeding, and pain remain WNL.  Goal: Patient-Specific Goal (Individualized)  Outcome: Ongoing, Progressing  Flowsheets (Taken 10/3/2022 1246)  Patient-Specific Goals (Include Timeframe): Pain well controlled during 2 hour recovery period, pain remain 5 or below  Individualized Care Needs: Keep informed with POC, answer questions, educate, lactation consult, assist with breastfeeding  Anxieties, Fears or Concerns: Breastfeeding, twins  Goal: Absence of Hospital-Acquired Illness or Injury  Outcome: Ongoing, Progressing  Intervention: Identify and Manage Fall Risk  Recent Flowsheet Documentation  Taken 10/3/2022 1223 by Gracie Stevens, RN  Safety Promotion/Fall Prevention:   assistive device/personal items within reach   clutter free environment maintained   fall prevention program maintained   lighting adjusted   nonskid shoes/slippers when out of bed   room organization consistent   safety round/check completed  Taken 10/3/2022 0928 by Gracie Stevens, RN  Safety Promotion/Fall Prevention:   assistive device/personal items within reach   clutter free environment maintained   fall prevention program maintained   lighting adjusted   nonskid shoes/slippers when out of bed   room organization consistent   safety round/check completed  Intervention: Prevent and Manage VTE (Venous Thromboembolism) Risk  Recent Flowsheet Documentation  Taken 10/3/2022 1238 by Gracie Stevens, RN  Activity Management: bedrest  VTE Prevention/Management:   bilateral   sequential compression  devices on  Taken 10/3/2022 1223 by Gracie Stevens RN  Activity Management: bedrest  VTE Prevention/Management:   bilateral   sequential compression devices on  Taken 10/3/2022 0928 by Gracie Stevens RN  Activity Management:   activity adjusted per tolerance   up ad cali  Goal: Optimal Comfort and Wellbeing  Outcome: Ongoing, Progressing  Intervention: Provide Person-Centered Care  Recent Flowsheet Documentation  Taken 10/3/2022 1223 by Gracie Stevens RN  Trust Relationship/Rapport:   care explained   choices provided   emotional support provided   empathic listening provided   questions answered   questions encouraged   reassurance provided   thoughts/feelings acknowledged  Taken 10/3/2022 0928 by Gracie Stevens RN  Trust Relationship/Rapport:   care explained   choices provided   emotional support provided   empathic listening provided   questions answered   questions encouraged   reassurance provided   thoughts/feelings acknowledged  Goal: Readiness for Transition of Care  Outcome: Ongoing, Progressing  Intervention: Mutually Develop Transition Plan  Recent Flowsheet Documentation  Taken 10/3/2022 0923 by Gracie Stevens, RN  Equipment Currently Used at Home: glucometer  Taken 10/3/2022 0920 by Gracie Stevens RN  Equipment Needed After Discharge: none  Equipment Currently Used at Home: none  Anticipated Changes Related to Illness: none  Transportation Anticipated:   car, drives self   family or friend will provide  Transportation Concerns: none  Concerns to be Addressed:   no discharge needs identified   denies needs/concerns at this time  Readmission Within the Last 30 Days: no previous admission in last 30 days  Patient/Family Anticipated Services at Transition: none  Patient/Family Anticipates Transition to:   home   home with family     Problem:  Fall Injury Risk  Goal: Absence of Fall, Infant Drop and Related Injury  Outcome: Ongoing, Progressing  Intervention:  Identify and Manage Contributors  Recent Flowsheet Documentation  Taken 10/3/2022 1223 by Gracie Stevens, RN  Medication Review/Management:   medications reviewed   high-risk medications identified  Taken 10/3/2022 0928 by Gracie Stevens RN  Medication Review/Management:   medications reviewed   high-risk medications identified  Intervention: Promote Injury-Free Environment  Recent Flowsheet Documentation  Taken 10/3/2022 1223 by Gracie Stevens RN  Safety Promotion/Fall Prevention:   assistive device/personal items within reach   clutter free environment maintained   fall prevention program maintained   lighting adjusted   nonskid shoes/slippers when out of bed   room organization consistent   safety round/check completed  Taken 10/3/2022 0928 by Gracie Stevens RN  Safety Promotion/Fall Prevention:   assistive device/personal items within reach   clutter free environment maintained   fall prevention program maintained   lighting adjusted   nonskid shoes/slippers when out of bed   room organization consistent   safety round/check completed     Problem: Skin Injury Risk Increased  Goal: Skin Health and Integrity  Outcome: Ongoing, Progressing     Problem: Device-Related Complication Risk (Anesthesia/Analgesia, Neuraxial)  Goal: Safe Infusion Delivery Completion  Outcome: Ongoing, Progressing     Problem: Infection (Anesthesia/Analgesia, Neuraxial)  Goal: Absence of Infection Signs and Symptoms  Outcome: Ongoing, Progressing     Problem: Nausea and Vomiting (Anesthesia/Analgesia, Neuraxial)  Goal: Nausea and Vomiting Relief  Outcome: Ongoing, Progressing     Problem: Pain (Anesthesia/Analgesia, Neuraxial)  Goal: Effective Pain Control  Outcome: Ongoing, Progressing     Problem: Respiratory Compromise (Anesthesia/Analgesia, Neuraxial)  Goal: Effective Oxygenation and Ventilation  Outcome: Ongoing, Progressing     Problem: Sensorimotor Impairment (Anesthesia/Analgesia, Neuraxial)  Goal:  Baseline Motor Function  Outcome: Ongoing, Progressing  Intervention: Optimize Sensorimotor Function  Recent Flowsheet Documentation  Taken 10/3/2022 1223 by Gracie Stevens, RN  Safety Promotion/Fall Prevention:   assistive device/personal items within reach   clutter free environment maintained   fall prevention program maintained   lighting adjusted   nonskid shoes/slippers when out of bed   room organization consistent   safety round/check completed  Taken 10/3/2022 0928 by Gracie Stevens RN  Safety Promotion/Fall Prevention:   assistive device/personal items within reach   clutter free environment maintained   fall prevention program maintained   lighting adjusted   nonskid shoes/slippers when out of bed   room organization consistent   safety round/check completed     Problem: Urinary Retention (Anesthesia/Analgesia, Neuraxial)  Goal: Effective Urinary Elimination  Outcome: Ongoing, Progressing   Goal Outcome Evaluation:  Plan of Care Reviewed With: patient, spouse        Progress: improving  Outcome Evaluation: Pt delivered twin boys by primary c/s with bilateral tubal. Mom and both babies are doing well at this time, pt is attempting to breastfeed both babies. Plan is to transfer to postpartum after reovery period if vitals, bleeding, and pain remain WNL.

## 2022-10-03 NOTE — ANESTHESIA POSTPROCEDURE EVALUATION
"Patient: Xochilt Oreilly    Procedure Summary     Date: 10/03/22 Room / Location:  [facility]    Anesthesia Start: 1105 Anesthesia Stop: 121    Procedure:  SECTION PRIMARY (N/A Abdomen) Diagnosis:     Surgeons: Kathy Bai MD Provider: Chuy Jaquez MD    Anesthesia Type: spinal ASA Status: 2          Anesthesia Type: spinal    Vitals  Vitals Value Taken Time   /76 10/03/22 1431   Temp 36.6 °C (97.8 °F) 10/03/22 1430   Pulse 79 10/03/22 1436   Resp 18 10/03/22 1232   SpO2 97 % 10/03/22 1436           Post Anesthesia Care and Evaluation    Level of consciousness: awake and alert  Pain management: adequate    Airway patency: patent  Anesthetic complications: No anesthetic complications  PONV Status: none  Cardiovascular status: acceptable  Respiratory status: acceptable  Hydration status: acceptable    Comments: /76   Pulse 79   Temp 36.6 °C (97.8 °F) (Oral)   Resp 18   Ht 170.2 cm (67\")   Wt 97.5 kg (215 lb)   LMP 2021   SpO2 97%   Breastfeeding Yes   BMI 33.67 kg/m²         "

## 2022-10-03 NOTE — H&P
[facility]  Obstetric History and Physical    Patient Name: Xochilt Oreilly  :  1990  MRN:  [id number]      Chief Complaint   Patient presents with   • Scheduled      Pt 37 wks and 1 day, scheduled c/s for twins and GDM. Pt reports good fetal movement and some irregular ctx. Pt denies LOF and vaginal bleeding. Pt states she has some pink discharge this weekend and may have lost her mucus plug.        Subjective     Patient is a 32 y.o. female  currently at 37w1d, who presents for primary  delivery due to di/di twins.  Declined trial of labor.  Pregnancy complicated by insulin-requiring GDM.  Requests tubal sterilization.      Objective       Vital Signs Range for the last 24 hours  Temperature: Temp:  [98.3 °F (36.8 °C)] 98.3 °F (36.8 °C)   Temp Source: Temp src: Oral   BP: BP: (114-121)/(74-81) 114/74   Pulse: Heart Rate:  [] 99   Respirations: Resp:  [18] 18                   Physical Examination:     General :  Alert in NAD  Abdomen: Gravid, soft    Presentation: ceph/ceph                       Fetal Heart Rate Assessment reactive x2                                       Uterine Assessment irritable                                                     Results from last 7 days   Lab Units 10/03/22  0931   WBC 10*3/mm3 10.34   HEMOGLOBIN g/dL 12.4   HEMATOCRIT % 37.2   PLATELETS 10*3/mm3 204       Assessment & Plan     1.  Intrauterine pregnancy at 37w1d weeks gestation for primary  with di/di twins.   reactive fetal status x2.  Plan of care has been reviewed with patient and family.  All questions answered.      * No active hospital problems. *        H&P updated. The patient was examined and the following changes are noted above.         Kathy Bai MD  10/3/2022  10:40 EDT

## 2022-10-03 NOTE — LACTATION NOTE
"This note was copied from a baby's chart.  P2 37w1d twins - new admission.  Mom states she \"tried BF'g\" her first \"but she never latched & later learned she was tongue-tied\" so she exclusively pumped for 4-6mos & was an oversupplier although she denies any problems with oversupply such as plugged ducts, mastitis, etc.    Mom required insulin tx for GDM w/this pregnancy.  Both babies currently in nursery.  Given hand pump & supplies for collecting milk w/hand expression, syringing milk to babies & cleaning supplies.  Mom is not feeling well after c/s but she is agreeable to LC assisting w/milk expression.  Hand expressed & used hand pump to express 2ml of colostrum.  Syringed up & taken to nursery, labeled & notified nursery RN's.  Possible nipple bleb on left nipple.  Recommended warm water soaks, lanolin (given) & gentle exfoliation to assist with unroofing bleb.    Mom has a personal pump.  Education provided:  feeding cues; frequency/duration; diaper expectations; milk production in relation to infant’s small stomach size; drops of colostrum being normal the first few days progressing to increasing amounts of milk & eventually full supply 3-5 days after delivery; & hand expression.  Verbalized understanding.     Mother denies questions/concerns at this time.  Encouraged to call for help when needed.  LC # on WB.      "

## 2022-10-04 LAB
BASOPHILS # BLD AUTO: 0.04 10*3/MM3 (ref 0–0.2)
BASOPHILS NFR BLD AUTO: 0.4 % (ref 0–1.5)
DEPRECATED RDW RBC AUTO: 41.5 FL (ref 37–54)
EOSINOPHIL # BLD AUTO: 0.22 10*3/MM3 (ref 0–0.4)
EOSINOPHIL NFR BLD AUTO: 1.9 % (ref 0.3–6.2)
ERYTHROCYTE [DISTWIDTH] IN BLOOD BY AUTOMATED COUNT: 13.2 % (ref 12.3–15.4)
HCT VFR BLD AUTO: 30.9 % (ref 34–46.6)
HGB BLD-MCNC: 10.9 G/DL (ref 12–15.9)
IMM GRANULOCYTES # BLD AUTO: 0.06 10*3/MM3 (ref 0–0.05)
IMM GRANULOCYTES NFR BLD AUTO: 0.5 % (ref 0–0.5)
LYMPHOCYTES # BLD AUTO: 1.77 10*3/MM3 (ref 0.7–3.1)
LYMPHOCYTES NFR BLD AUTO: 15.6 % (ref 19.6–45.3)
MCH RBC QN AUTO: 30.5 PG (ref 26.6–33)
MCHC RBC AUTO-ENTMCNC: 35.3 G/DL (ref 31.5–35.7)
MCV RBC AUTO: 86.6 FL (ref 79–97)
MONOCYTES # BLD AUTO: 0.89 10*3/MM3 (ref 0.1–0.9)
MONOCYTES NFR BLD AUTO: 7.8 % (ref 5–12)
NEUTROPHILS NFR BLD AUTO: 73.8 % (ref 42.7–76)
NEUTROPHILS NFR BLD AUTO: 8.37 10*3/MM3 (ref 1.7–7)
NRBC BLD AUTO-RTO: 0 /100 WBC (ref 0–0.2)
PLATELET # BLD AUTO: 154 10*3/MM3 (ref 140–450)
PMV BLD AUTO: 12.2 FL (ref 6–12)
RBC # BLD AUTO: 3.57 10*6/MM3 (ref 3.77–5.28)
WBC NRBC COR # BLD: 11.35 10*3/MM3 (ref 3.4–10.8)

## 2022-10-04 PROCEDURE — 25010000002 KETOROLAC TROMETHAMINE PER 15 MG: Performed by: OBSTETRICS & GYNECOLOGY

## 2022-10-04 PROCEDURE — 85025 COMPLETE CBC W/AUTO DIFF WBC: CPT | Performed by: OBSTETRICS & GYNECOLOGY

## 2022-10-04 RX ORDER — IBUPROFEN 600 MG/1
600 TABLET ORAL EVERY 6 HOURS SCHEDULED
Status: COMPLETED | OUTPATIENT
Start: 2022-10-04 | End: 2022-10-06

## 2022-10-04 RX ADMIN — KETOROLAC TROMETHAMINE 15 MG: 15 INJECTION, SOLUTION INTRAMUSCULAR; INTRAVENOUS at 06:34

## 2022-10-04 RX ADMIN — Medication 1 TABLET: at 08:01

## 2022-10-04 RX ADMIN — DOCUSATE SODIUM 100 MG: 100 CAPSULE, LIQUID FILLED ORAL at 19:57

## 2022-10-04 RX ADMIN — ACETAMINOPHEN 650 MG: 325 TABLET, FILM COATED ORAL at 17:21

## 2022-10-04 RX ADMIN — Medication 1 APPLICATION: at 18:48

## 2022-10-04 RX ADMIN — KETOROLAC TROMETHAMINE 15 MG: 15 INJECTION, SOLUTION INTRAMUSCULAR; INTRAVENOUS at 01:08

## 2022-10-04 RX ADMIN — IBUPROFEN 600 MG: 600 TABLET, FILM COATED ORAL at 17:20

## 2022-10-04 RX ADMIN — OXYCODONE HYDROCHLORIDE 10 MG: 10 TABLET ORAL at 20:20

## 2022-10-04 RX ADMIN — OXYCODONE 5 MG: 5 TABLET ORAL at 14:09

## 2022-10-04 RX ADMIN — ACETAMINOPHEN 1000 MG: 500 TABLET, FILM COATED ORAL at 11:47

## 2022-10-04 RX ADMIN — ACETAMINOPHEN 650 MG: 325 TABLET, FILM COATED ORAL at 22:26

## 2022-10-04 RX ADMIN — ACETAMINOPHEN 1000 MG: 500 TABLET, FILM COATED ORAL at 04:45

## 2022-10-04 RX ADMIN — DOCUSATE SODIUM 100 MG: 100 CAPSULE, LIQUID FILLED ORAL at 08:01

## 2022-10-04 NOTE — LACTATION NOTE
This note was copied from a baby's chart.  Mom reports she has been trying to breast feed but baby has been sleepy then she uses HGP but she is not getting any milk yet therefore she is supplementing with formula. Mom will call for latch assistance today.  Encouraged hydration and pumping every 3 hrs.

## 2022-10-04 NOTE — LACTATION NOTE
This note was copied from a baby's chart.  Mom called for latch assistance. Placed baby STS, expressed a drop of colostrum, baby licks but is sleepy and will not latch. We tried multiple positions, expressing milk for him but he remains sleepy. Mom then supplemented with formula and then she plans to pump.  Discussed pumping every 3 hrs if baby is too sleepy to nurse and call for further assistance.

## 2022-10-04 NOTE — PROGRESS NOTES
[facility]   PROGRESS NOTE    Patient Name: Xochilt Oreilly  :  1990  MRN:  [id number]      Post-Op Day 1 S/P    Delivered male infant twins.  Subjective     Patient reports:    Pain is well controlled. Voiding and ambulating without difficulty.    Tolerating po. Lochia normal.   Pt tearful in room- 1 baby is at [facility]      The patient plans to undecided.         Objective       Vitals: Vital Signs Range for the last 24 hours  Temperature: Temp:  [97.8 °F (36.6 °C)-98.5 °F (36.9 °C)] 98 °F (36.7 °C)   Temp Source: Temp src: Oral   BP: BP: ()/(56-83) 96/61   Pulse: Heart Rate:  [] 101   Respirations: Resp:  [16-18] 16         Intake/Output Summary (Last 24 hours) at 10/4/2022 0819  Last data filed at 10/4/2022 0610  Gross per 24 hour   Intake 3021 ml   Output 3030 ml   Net -9 ml                                              Physical Exam     General Alert and awake, in NAD      CV Regular rate and rhythm      Lungs clear to auscultation bilaterally        Abdomen Soft, non-distended, fundus firm,  1 below umbilicus, appropriately tender      Incision  Dressing clean, dry and intact.      Extremities  trace edema, calves NT               LABS: Results from last 7 days   Lab Units 10/04/22  0649 10/03/22  0931   WBC 10*3/mm3 11.35* 10.34   HEMOGLOBIN g/dL 10.9* 12.4   HEMATOCRIT % 30.9* 37.2   PLATELETS 10*3/mm3 154 204         Prenatal labs results reviewed:  Yes   Rubella:  immunre  Rh Status:    RH type   Date Value Ref Range Status   10/03/2022 Positive  Final                       Assessment & Plan   : 1. POD 1 S/P C/S: Hemodynamically stable.  Doing well.  Continue routine care. -desires circ for baby here, other baby is @ [facility]. Wants d/c tomorrow if possible      * No active hospital problems. *          KAYLYNN Saeed  10/4/2022  08:19 EDT

## 2022-10-04 NOTE — PLAN OF CARE
Goal Outcome Evaluation:           Progress: improving  Outcome Evaluation: VSS, pain well controlled, ambulation encouraged, needs some help still with breastfeeding, plans to shower and remove dressing today

## 2022-10-05 PROBLEM — Z98.891 STATUS POST CESAREAN DELIVERY: Status: ACTIVE | Noted: 2022-10-05

## 2022-10-05 PROBLEM — O43.213 PLACENTA ACCRETA, THIRD TRIMESTER: Status: ACTIVE | Noted: 2022-10-05

## 2022-10-05 PROBLEM — O30.049 TWIN PREGNANCY, DICHORIONIC/DIAMNIOTIC, UNSPECIFIED TRIMESTER: Status: ACTIVE | Noted: 2022-10-05

## 2022-10-05 LAB
LAB AP CASE REPORT: NORMAL
PATH REPORT.FINAL DX SPEC: NORMAL
PATH REPORT.GROSS SPEC: NORMAL

## 2022-10-05 RX ORDER — FAMOTIDINE 20 MG/1
20 TABLET, FILM COATED ORAL
Status: DISCONTINUED | OUTPATIENT
Start: 2022-10-05 | End: 2022-10-05

## 2022-10-05 RX ORDER — POLYETHYLENE GLYCOL 3350 17 G/17G
17 POWDER, FOR SOLUTION ORAL DAILY
Status: DISCONTINUED | OUTPATIENT
Start: 2022-10-05 | End: 2022-10-07 | Stop reason: HOSPADM

## 2022-10-05 RX ORDER — FAMOTIDINE 20 MG/1
20 TABLET, FILM COATED ORAL 2 TIMES DAILY PRN
Status: DISCONTINUED | OUTPATIENT
Start: 2022-10-05 | End: 2022-10-07 | Stop reason: HOSPADM

## 2022-10-05 RX ORDER — CALCIUM CARBONATE 200(500)MG
1 TABLET,CHEWABLE ORAL 2 TIMES DAILY PRN
Status: DISCONTINUED | OUTPATIENT
Start: 2022-10-05 | End: 2022-10-07 | Stop reason: HOSPADM

## 2022-10-05 RX ADMIN — OXYCODONE HYDROCHLORIDE 10 MG: 10 TABLET ORAL at 02:05

## 2022-10-05 RX ADMIN — DOCUSATE SODIUM 100 MG: 100 CAPSULE, LIQUID FILLED ORAL at 08:00

## 2022-10-05 RX ADMIN — ACETAMINOPHEN 650 MG: 325 TABLET, FILM COATED ORAL at 17:25

## 2022-10-05 RX ADMIN — ACETAMINOPHEN 650 MG: 325 TABLET, FILM COATED ORAL at 12:25

## 2022-10-05 RX ADMIN — DOCUSATE SODIUM 100 MG: 100 CAPSULE, LIQUID FILLED ORAL at 20:29

## 2022-10-05 RX ADMIN — SIMETHICONE 80 MG: 80 TABLET, CHEWABLE ORAL at 05:12

## 2022-10-05 RX ADMIN — ACETAMINOPHEN 650 MG: 325 TABLET, FILM COATED ORAL at 05:09

## 2022-10-05 RX ADMIN — IBUPROFEN 600 MG: 600 TABLET, FILM COATED ORAL at 02:05

## 2022-10-05 RX ADMIN — IBUPROFEN 600 MG: 600 TABLET, FILM COATED ORAL at 14:13

## 2022-10-05 RX ADMIN — SIMETHICONE 80 MG: 80 TABLET, CHEWABLE ORAL at 21:05

## 2022-10-05 RX ADMIN — FAMOTIDINE 20 MG: 20 TABLET ORAL at 15:07

## 2022-10-05 RX ADMIN — IBUPROFEN 600 MG: 600 TABLET, FILM COATED ORAL at 08:00

## 2022-10-05 RX ADMIN — ANTACID TABLETS 1 TABLET: 500 TABLET, CHEWABLE ORAL at 14:11

## 2022-10-05 RX ADMIN — Medication 1 TABLET: at 08:00

## 2022-10-05 RX ADMIN — IBUPROFEN 600 MG: 600 TABLET, FILM COATED ORAL at 20:29

## 2022-10-05 NOTE — PLAN OF CARE
Goal Outcome Evaluation:  Plan of Care Reviewed With: patient, spouse        Progress: improving   Vital signs stable. Pain is controlled with po medication. Up ad cali in room. Tolerating regular diet. Voiding without difficulty. Pt is pumping and supplementing infants.

## 2022-10-05 NOTE — PROGRESS NOTES
[facility]   PROGRESS NOTE    Patient Name: Xochilt Oreilly  :  1990  MRN:  [id number]      Post-Op Day 2 S/P   Subjective     Patient reports:   Doing well. Pain well controlled. Tolerating regular diet and having flatus.    Lochia normal.     Happy because her son was transferred back to [facility] from [place] without needing to undergo surgery.       Objective       Vitals: Vital Signs Range for the last 24 hours  Temperature: Temp:  [97.7 °F (36.5 °C)-98.1 °F (36.7 °C)] 97.7 °F (36.5 °C)       BP: BP: (103-123)/(65-79) 117/75   Pulse: Heart Rate:  [] 89   Respirations: Resp:  [16-18] 18                                         Physical Exam     General Alert       Abdomen Soft, non-distended, fundus firm,  below umbilicus, appropriately tender      Incision  Intact, no erythema or exudate      Extremities Calves NT bilaterally                Assessment & Plan  :   POD 2  -  Doing well.  Continue usual cares.     Discussed wound care and appropriate post-op activity with patient this AM.    Desires circ for Baby B > however with frequent low blood sugar, will check with peds prior to proceeding today    Spoke with placental pathologist who identified a focus of placenta accreta on exam. Patient without significant bleeding, Hgb stable.          Placenta accreta, third trimester    Status post  delivery    Twin pregnancy, dichorionic/diamniotic, unspecified trimester               Daisha Abarca MD  10/5/2022  16:19 EDT

## 2022-10-05 NOTE — LACTATION NOTE
Patient needing help with latch Baby active and rooting purposefully. Able to get a deep latch on left but mom has a nipple bleb on that nipple and it is very painful. Moved baby to football position on left and it was more tolerable. Breast compression taught to keep baby swallowing. Patient called for LC to return and check nipple shape but it was rounded already. LC assisted with latch to right nipple in ventral position and baby got a deep latch that mother said was the best she has felt since they were born. Baby Brother in NBN for evaluation. Mom pumping p.c. with HGP but not getting much colostrum. Colostrum is easily expressed by hand.  Lactation Consult Note    Evaluation Completed: 10/5/2022 18:13 EDT  Patient Name: Xochilt Oreilly  :  1990  MRN:  [id number]     REFERRAL  INFORMATION:                          Date of Referral: 10/05/22   Person Making Referral: patient  Maternal Reason for Referral: breastfeeding currently, breast/nipple pain  Infant Reason for Referral: sleepy    DELIVERY HISTORY:     Cinthya Oreilly [[id number]]         Cinthya Oreilly [[id number]]           Cinthya Oreilly [[id number]]         Cinthya Oreilly [[id number]]        Skin to skin initiation date/time:      Cinthya Oreilly [[id number]]         Cinthya Oreilly [[id number]]            Cinthya Oreilly [[id number]]         Cinthya Oreilly [[id number]]        Skin to skin end date/time:      Cinthya Oreilly [[id number]]         Cinthya Oreilly [[id number]]            Cinthya Oreilly [[id number]]         Cinthya Oreilly [[id number]]           Cinthya Oreilly [[id number]]         Cinthya Oreilly [[id number]]          MATERNAL ASSESSMENT:     Breast Shape: pendulous, round (10/05/22 1800)  Breast Density: filling (10/05/22 1800)  Areola: dense (10/05/22 1800)  Nipples: everted (10/05/22 1800)     Left Nipple Symptoms: redness, other (see comments) (large  "nipple bleb at 3 o\"clock) (10/05/22 1800)  Right Nipple Symptoms: redness, tender (10/05/22 1800)       INFANT ASSESSMENT:  Information for the patient's :  Cinthya Oreilly [[id number]]   No past medical history on file.   Information for the patient's :  Cinthya Oreilly [[id number]]     Past Medical History:   Diagnosis Date   • Bilateral hydrocele 10/3/2022    Assessment: Bilateral hydroceles appreciated on exam during initial hospital stay. Evaluated by Urology at [facility]. Not noted on exam 10/4 at readmission   • Rule-out left testicular torsion 10/3/2022    Assessment: Infant with bilateral hydroceles and overlying left testicle there is a more prominent blue discoloration with diffuse dusky area, teste is palpable in inguinal canal higher than area of discoloration in scrotum. It is soft and mobile. A testicular US was obtained. The first study was unable to confirm an arterial pulse waveforms in either testicles. Pediatric Urology contacted and agr         Cinthya Oreilly [[id number]]         Cinthya Oreilly [[id number]]           Cinthya Oreilly [[id number]]         Cinthya Oreilly [[id number]]           Cinthya Oreilly [[id number]]         Cinthya Oreilly [[id number]]           Cinthya Oreilly [[id number]]         Cinthya Oreilly [[id number]]           Cinthya Oreilly [[id number]]         Cinthya Oreilly B [[id number]]           Cinthya Oreilly [[id number]]         Cinthya Oreilly B [[id number]]           Cinthya Oreilly [[id number]]         Cinthya Oreilly [[id number]]           Cinthya Oreilly [[id number]]         Cinthya Oreilly [[id number]]           Cinthya Oreilly [[id number]]         Cinthya Oreilly [[id number]]           Cinthya Oreilly [[id number]]         Cinthya Oreilly [[id number]]           Cinthya Oreilly [[id number]]         Cinthya Oreilly [[id number]]         "   Oreilly, AshleysBoy A [[id number]]         Oreilly, AshleysBoy B [[id number]]           Oreilly, AshleysBoy A [[id number]]         Oreilly, AshleysBoy B [[id number]]           Oreilly, AshleysBoy A [[id number]]         Oreilly, AshleysBoy B [[id number]]           Oreilly, AshleysBoy A [[id number]]         Oreilly, AshleysBoy B [[id number]]           Oreilly, AshleysBoy A [[id number]]         Oreilly, AshleysBoy B [[id number]]           Oreilly, AshleysBoy A [[id number]]         Oreilly, AshleysBoy B [[id number]]           Oreilly, AshleysBoy A [[id number]]         Oreilly, AshleysBoy B [[id number]]           Oreilly, AshleysBoy A [[id number]]         Oreilly, AshleysBoy B [[id number]]               Oreilly, AshleysBoy A [[id number]]         Oreilly, AshleysBoy B [[id number]]           Oreilly, AshleysBoy A [[id number]]         Oreilly, AshleysBoy B [[id number]]           Oreilly, AshleysBoy A [[id number]]         Oreilly, AshleysBoy B [[id number]]           Oreilly, AshleysBoy A [[id number]]         Oreilly, AshleysBoy B [[id number]]           Oreilly, AshleysBoy A [[id number]]         Oreilly, AshleysBoy B [[id number]]           Oreilly, AshleysBoy A [[id number]]         Oreilly, AshleysBoy B [[id number]]             Oreilly, AshleysBoy A [[id number]]         Oreilly, AshleysBoy B [[id number]]           Oreilly, AshleysBoy A [[id number]]         Oreilly, AshleysBoy B [[id number]]           Oreilly, AshleysBoy A [[id number]]         Oreilly, AshleysBoy B [[id number]]              MATERNAL INFANT FEEDING:     Maternal Emotional State: receptive (10/05/22 1800)  Infant Positioning: cross-cradle (10/05/22 1800)   Signs of Milk Transfer: deep jaw excursions noted, audible swallow, suck/swallow ratio, transfer present (10/05/22 1800)  Pain with Feeding: yes (10/05/22 1800)  Pain Location: nipples, bilateral (10/05/22 1800)        Milk Ejection Reflex: present (10/05/22 1800)  Comfort Measures Following Feeding: air-drying encouraged, expressed milk  applied, soap use discouraged (10/05/22 1800)        Latch Assistance: full assistance needed (10/05/22 1800)                               EQUIPMENT TYPE:  Breast Pump Type: double electric, hospital grade (10/05/22 1800)  Breast Pump Flange Type: hard (10/05/22 1800)                           BREAST PUMPING:          LACTATION REFERRALS:  Lactation Referrals: outpatient lactation program, support group (10/05/22 1800)

## 2022-10-05 NOTE — LACTATION NOTE
Patient states baby boys have not latched since the first night . Tonny had gone to [facility] yesterday but is back.  Babies taking formula and mom is pumping q three hours but is concerned that she is not getting anything . Reassurance given . LC will be called to assist.

## 2022-10-06 RX ORDER — IBUPROFEN 600 MG/1
600 TABLET ORAL EVERY 6 HOURS SCHEDULED
Status: DISCONTINUED | OUTPATIENT
Start: 2022-10-06 | End: 2022-10-07 | Stop reason: HOSPADM

## 2022-10-06 RX ADMIN — OXYCODONE 5 MG: 5 TABLET ORAL at 11:41

## 2022-10-06 RX ADMIN — IBUPROFEN 600 MG: 600 TABLET, FILM COATED ORAL at 15:29

## 2022-10-06 RX ADMIN — ACETAMINOPHEN 650 MG: 325 TABLET, FILM COATED ORAL at 17:59

## 2022-10-06 RX ADMIN — IBUPROFEN 600 MG: 600 TABLET, FILM COATED ORAL at 02:28

## 2022-10-06 RX ADMIN — IBUPROFEN 600 MG: 600 TABLET, FILM COATED ORAL at 08:48

## 2022-10-06 RX ADMIN — OXYCODONE HYDROCHLORIDE 10 MG: 10 TABLET ORAL at 21:15

## 2022-10-06 RX ADMIN — ACETAMINOPHEN 650 MG: 325 TABLET, FILM COATED ORAL at 11:41

## 2022-10-06 RX ADMIN — OXYCODONE 5 MG: 5 TABLET ORAL at 02:29

## 2022-10-06 RX ADMIN — Medication 1 TABLET: at 08:48

## 2022-10-06 RX ADMIN — ACETAMINOPHEN 650 MG: 325 TABLET, FILM COATED ORAL at 00:01

## 2022-10-06 RX ADMIN — DOCUSATE SODIUM 100 MG: 100 CAPSULE, LIQUID FILLED ORAL at 21:12

## 2022-10-06 RX ADMIN — ACETAMINOPHEN 650 MG: 325 TABLET, FILM COATED ORAL at 05:52

## 2022-10-06 RX ADMIN — IBUPROFEN 600 MG: 600 TABLET, FILM COATED ORAL at 21:12

## 2022-10-06 NOTE — PROGRESS NOTES
Discharge Planning Assessment  [facility]     Patient Name: Xochilt Oreilly  MRN: [id number]  Today's Date: 10/6/2022    Admit Date: 10/3/2022    Plan: Infants may discharge to mother when medically ready. VERONA Christensen   Discharge Needs Assessment    No documentation.                Discharge Plan     Row Name 10/06/22 1530       Plan    Plan Infants may discharge to mother when medically ready. VERONA Christensen    Plan Comments Mother: Xochilt Oreilly, MRN [id number]; infant A: Cinthya DOMINGUEZ “José Luis” Denilson, MRN [id number]; infant B: Cinthya WHITMAN “Luke” Denilson, MRN [id number]. CSW consulted for “NICU admission, twin”-twin B was admitted to NICU and twin A remains in room with mother. Of note, no toxicology screens were obtained as need was not warranted at this time. CSW met with mother and father of infants/spouse at bedside. Mother verified address, phone number, and insurance. Parents intend to add infants to their insurance plan. Mother reports having car seats, cribs/bassinets, clothes, and diapers for infants. Mother and father also have a 2 year old daughter who is in the care of maternal grandparents of infants at this time. Mother shared she also has other family and friends available and offering support as needed. [facility], mother is comfortable scheduling appointments, and has transportation. Mother is not current with [facility] but familiar with the program. CSW spent time building rapport with mother and father, and offered validation, support, and encouragement throughout assessment. CSW provided a packet of resources including: WIC, HANDS, transportation, infant supplies, counseling, online support groups, postpartum mood and anxiety resources, NICU parent resources, and general community resources. Parents were polite and cooperative, and denied having unmet needs at this time. CSW will remain available for psychosocial needs while infant B is in the NICU.  VERONA Christensen              Continued Care and Services - Admitted Since 10/3/2022    Coordination has not been started for this encounter.          Demographic Summary     Row Name 10/06/22 1529       General Information    Admission Type inpatient    Arrived From home    Referral Source nursing    Reason for Consult psychosocial concerns    General Information Comments NICU admit               Functional Status    No documentation.                Psychosocial     Row Name 10/06/22 1529       Behavior WDL    Behavior WDL WDL       Emotion Mood WDL    Emotion/Mood/Affect WDL WDL       Speech WDL    Speech WDL WDL       Perceptual State WDL    Perceptual State WDL WDL       Thought Process WDL    Thought Process WDL WDL       Intellectual Performance WDL    Intellectual Performance WDL WDL       Coping/Stress    Major Change/Loss/Stressor birth;medical condition/diagnosis    Patient Personal Strengths able to adapt;expressive of emotions;flexibility;future/goal oriented;motivated;positive attitude;strong support system    Sources of Support other family members;parent(s);spouse               Abuse/Neglect     Row Name 10/06/22 1530       Personal Safety    Physical Signs of Abuse Present no               Legal    No documentation.                Substance Abuse    No documentation.                Patient Forms    No documentation.                   MEKHI Jj

## 2022-10-06 NOTE — PROGRESS NOTES
[facility]   PROGRESS NOTE    Patient Name: Xochilt Oreilly  :  1990  MRN:  [id number]      Post-Op 3 S/P   Subjective     Patient reports:   Doing well. Pain well controlled. Tolerating regular diet and having flatus.    Lochia normal.       Objective       Vitals: Vital Signs Range for the last 24 hours  Temperature: Temp:  [97.7 °F (36.5 °C)-98.2 °F (36.8 °C)] 98 °F (36.7 °C)       BP: BP: (114-117)/(72-75) 114/72   Pulse: Heart Rate:  [75-92] 75   Respirations: Resp:  [16-18] 16                                         Physical Exam     General Alert       Abdomen Soft, non-distended, fundus firm, 1 below umbilicus, appropriately tender      Incision  Intact, no erythema or exudate      Extremities Calves NT bilaterally                Assessment & Plan  :   POD 3  -  Doing well.  Continue usual cares. Wants to stay today, baby still in NICU          Placenta accreta, third trimester    Status post  delivery    Twin pregnancy, dichorionic/diamniotic, unspecified trimester               KAYLYNN Saeed  10/6/2022  08:33 EDT    Patient seen and examined. Agree with above assessment.   Flaquita Guerra MD  10/6/2022  15:36 EDT

## 2022-10-06 NOTE — LACTATION NOTE
Mom reports baby A is latching some and if baby is not latching, she use hgp. Mom reports getting approx. 15 cc's of colostrum now. Mom will start using sterilization bag today to sterilize pump pieces. Encouraged mom to call  if needing assistance    Lactation Consult Note    Evaluation Completed: 10/6/2022 08:39 EDT  Patient Name: Xochilt Oreilly  :  1990  MRN:  [id number]     REFERRAL  INFORMATION:                          Date of Referral: 10/05/22   Person Making Referral: patient  Maternal Reason for Referral: breastfeeding currently, breast/nipple pain  Infant Reason for Referral: sleepy    DELIVERY HISTORY:     Cinthya Oreilly [[id number]]         Cinthya Oreilly [[id number]]           Cinthya Oreilly [[id number]]         Cinthya Oreilly [[id number]]        Skin to skin initiation date/time:      Cinthya Oreilly [[id number]]         Cinthya Oreilly [[id number]]            Cinthya Oreilly [[id number]]         Cinthya Oreilly [[id number]]        Skin to skin end date/time:      Cinthya Oreilly [[id number]]         Cinthya Oreilly [[id number]]            Cinthya Oreilly [[id number]]         Cinthya Oreilly [[id number]]           Cinthya Oreilly [[id number]]         Cinthya Oreilly [[id number]]          MATERNAL ASSESSMENT:                               INFANT ASSESSMENT:  Information for the patient's :  Cinthya Oreilly [[id number]]   No past medical history on file.   Information for the patient's :  Cinthya Oreilly [[id number]]     Past Medical History:   Diagnosis Date   • Bilateral hydrocele 10/3/2022    Assessment: Bilateral hydroceles appreciated on exam during initial hospital stay. Evaluated by Urology at [facility]. Not noted on exam 10/4 at readmission   • Rule-out left testicular torsion 10/3/2022    Assessment: Infant with bilateral hydroceles and overlying left testicle there is a more prominent blue  discoloration with diffuse dusky area, teste is palpable in inguinal canal higher than area of discoloration in scrotum. It is soft and mobile. A testicular US was obtained. The first study was unable to confirm an arterial pulse waveforms in either testicles. Pediatric Urology contacted and agr         Cinthya Oreilly [[id number]]         Cinthya Oreilly B [[id number]]           Cinthya Oreilly A [[id number]]         Cinthya Oreilly B [[id number]]           Cinthya Oreilly A [[id number]]         Cinthya Oreilly B [[id number]]           Cinthya Oreilly A [[id number]]         Cinthya Oreilly B [[id number]]           Cinthya Oreilly A [[id number]]         Cinthya Oreilly B [[id number]]           Cinthya Oreilly A [[id number]]         Cinthya Oreilly B [[id number]]           Nilsa Oreillyy A [[id number]]         Cinthya Oreilly B [[id number]]           Nilsa Oreillyy A [[id number]]         Cinthya Oreilly B [[id number]]           Cinthya Oreilly A [[id number]]         Cinthya Oreilly B [[id number]]           Nilsa Oreillyy A [[id number]]         Cinthya Oreilly B [[id number]]           Jeevan OreillyBoy A [[id number]]         Jeevan OreillyBoy B [[id number]]           Jeevan OreillyBoy A [[id number]]         Cinthya Oreilly B [[id number]]           Cinthya Oreilly A [[id number]]         Nilsa Oreillyy B [[id number]]           Nilsa Oreillyy A [[id number]]         Nilsa Oreillyy B [[id number]]           Cinthya Oreilly A [[id number]]         Cinthya Oreilly B [[id number]]           Cinthya Oreilly [[id number]]         Cinthya Oreilly [[id number]]           Cinthya Oreilly [[id number]]         Cinthya Oreilly [[id number]]           Cinthya Oreilly [[id number]]         Cinthya Oreilly [[id number]]           Cinthya Oreilly [[id number]]         Cinthya Oreilly [[id number]]                Cinthya Oreilly [[id number]]         Cinthya Oreilly [[id number]]           Cinthya Oreilly [[id number]]         Cinthya Oreilly [[id number]]           Cinthya Oreilly [[id number]]         Cinthya Oreilly [[id number]]           Cinthya Oreilly [[id number]]         Cinthya Oreilly [[id number]]           Cinthya Oreilly [[id number]]         Cinthya Oreilly [[id number]]           Cinthya Oreilly [[id number]]         Cinthya Oreilly [[id number]]             Cinthya Oreilly [[id number]]         Cinthya Oreilly [[id number]]           Cinthya Oreilly [[id number]]         Cinthya Oreilly [[id number]]           Cinthya Oreilly [[id number]]         Cinthya Oreilly [[id number]]              MATERNAL INFANT FEEDING:                                                                       EQUIPMENT TYPE:                                 BREAST PUMPING:          LACTATION REFERRALS:

## 2022-10-07 VITALS
HEART RATE: 79 BPM | OXYGEN SATURATION: 98 % | WEIGHT: 215 LBS | SYSTOLIC BLOOD PRESSURE: 128 MMHG | BODY MASS INDEX: 33.74 KG/M2 | DIASTOLIC BLOOD PRESSURE: 82 MMHG | HEIGHT: 67 IN | TEMPERATURE: 97.7 F | RESPIRATION RATE: 16 BRPM

## 2022-10-07 RX ORDER — IBUPROFEN 600 MG/1
600 TABLET ORAL EVERY 6 HOURS SCHEDULED
Qty: 5 TABLET | Refills: 0 | Status: SHIPPED | OUTPATIENT
Start: 2022-10-07 | End: 2022-10-09

## 2022-10-07 RX ORDER — OXYCODONE HYDROCHLORIDE 5 MG/1
5 TABLET ORAL EVERY 4 HOURS PRN
Qty: 24 TABLET | Refills: 0 | Status: SHIPPED | OUTPATIENT
Start: 2022-10-07 | End: 2022-10-11

## 2022-10-07 RX ADMIN — POLYETHYLENE GLYCOL 3350 17 G: 17 POWDER, FOR SOLUTION ORAL at 09:20

## 2022-10-07 RX ADMIN — ACETAMINOPHEN 650 MG: 325 TABLET, FILM COATED ORAL at 06:17

## 2022-10-07 RX ADMIN — ACETAMINOPHEN 650 MG: 325 TABLET, FILM COATED ORAL at 11:36

## 2022-10-07 RX ADMIN — ACETAMINOPHEN 650 MG: 325 TABLET, FILM COATED ORAL at 00:09

## 2022-10-07 RX ADMIN — IBUPROFEN 600 MG: 600 TABLET, FILM COATED ORAL at 09:21

## 2022-10-07 RX ADMIN — DOCUSATE SODIUM 100 MG: 100 CAPSULE, LIQUID FILLED ORAL at 09:21

## 2022-10-07 RX ADMIN — Medication 1 TABLET: at 09:21

## 2022-10-07 RX ADMIN — IBUPROFEN 600 MG: 600 TABLET, FILM COATED ORAL at 04:08

## 2022-10-07 RX ADMIN — OXYCODONE 5 MG: 5 TABLET ORAL at 11:36

## 2022-10-07 NOTE — PLAN OF CARE
Goal Outcome Evaluation:           Progress: improving  Outcome Evaluation: VSS, pain well controlled, ambulating well, d/c today

## 2022-10-07 NOTE — PLAN OF CARE
Goal Outcome Evaluation:  Plan of Care Reviewed With: patient, spouse        Progress: improving   Vital signs stable. Pain is controlled with po medication. Voiding without difficulty. Up ad cali, ambulates to NICU to visit twin B. Tolerating regular diet. Anticipates discharge home today.

## 2022-10-07 NOTE — DISCHARGE SUMMARY
Date of Discharge:  10/7/2022    Discharge Diagnosis: Postop c/section day 4    Presenting Problem/History of Present Illness  Pregnancy [Z34.90]       Hospital Course  Patient is a 32 y.o. female presented with Twins.      Procedures Performed  Procedure(s):   SECTION PRIMARY         Condition on Discharge:  Post-Op Day 4 S/P   Subjective     Patient reports:    Doing well - ready for discharge. Pain well controlled. Tolerating po and   having flatus. Voiding and ambulating without difficulty. Lochia normal.     Vital Signs  Temp:  [97.7 °F (36.5 °C)-98 °F (36.7 °C)] 97.7 °F (36.5 °C)  Heart Rate:  [66-93] 79  Resp:  [16] 16  BP: (113-128)/(71-82) 128/82    Physical Exam    Gen Alert and awake   Abdomen Soft, ND,  Fundus firm with minimal tenderness   Incision  Intact, without erythema or exudate   Extremities Calves NT bilaterally     Assessment & Plan ]   Assessment:  POD 4  -  Doing well. Stable for discharge.  Twin delivery.  Baby BoyTonny will be staying in NICU - on O2 and feeding tube.  Has chest x-ray pending.  The other baby, José Luis will be discharged and has been circ.    Plan:    Instructions reviewed       Consults:   Consults     No orders found from 2022 to 10/4/2022.          Discharge Disposition  Home or Self Care    Discharge Medications     Discharge Medications      New Medications      Instructions Start Date   ibuprofen 600 MG tablet  Commonly known as: ADVIL,MOTRIN   600 mg, Oral, Every 6 Hours Scheduled      oxyCODONE 5 MG immediate release tablet  Commonly known as: ROXICODONE   5 mg, Oral, Every 4 Hours PRN         Continue These Medications      Instructions Start Date   Accu-Chek Guide w/Device kit   See Admin Instructions         Stop These Medications    aspirin 81 MG chewable tablet     cetirizine 10 MG tablet  Commonly known as: zyrTEC     cholecalciferol 25 MCG (1000 UT) tablet  Commonly known as: VITAMIN D3     docusate sodium 100 MG capsule  Commonly known  as: COLACE     fluticasone 50 MCG/ACT nasal spray  Commonly known as: FLONASE     insulin detemir 100 UNIT/ML injection  Commonly known as: LEVEMIR     magnesium oxide 400 tablet tablet  Commonly known as: MAG-OX     MAGNESIUM PO     omeprazole 20 MG capsule  Commonly known as: priLOSEC     PRENATAL 1+1 PO     VITAMIN D PO            The patient has been prescribed a controlled substance.  She has been counseled on the risks associated with using the medication.   The addictive potential of this medication and alternatives were discussed carefully with this patient and she demonstrated understanding.  A HERSON report has been obtained and reviewed.    Activity at Discharge: restrictions reviewed    Follow-up Appointments  No future appointments.      Test Results Pending at Discharge       JANINA Cardenas  10/07/22  10:37 EDT

## (undated) DEVICE — SOL IRR NACL 0.9PCT BT 1000ML

## (undated) DEVICE — GLV SURG BIOGEL LTX PF 6

## (undated) DEVICE — NDL BLNT 18G 1 1/2IN

## (undated) DEVICE — 3M™ STERI-STRIP™ COMPOUND BENZOIN TINCTURE 40 BAGS/CARTON 4 CARTONS/CASE C1544: Brand: 3M™ STERI-STRIP™

## (undated) DEVICE — ANTIBACTERIAL UNDYED BRAIDED (POLYGLACTIN 910), SYNTHETIC ABSORBABLE SUTURE: Brand: COATED VICRYL

## (undated) DEVICE — TBG W FLTR FOR BERKELEY SYSTEM

## (undated) DEVICE — TP SXN VACURETTE CRV 7MM

## (undated) DEVICE — GLV SURG SENSICARE PI MIC PF SZ6.5 LF STRL

## (undated) DEVICE — DEV TISS REMOV MYOSURE REACH

## (undated) DEVICE — Device

## (undated) DEVICE — 3M(TM) TEGADERM(TM) TRANSPARENT FILM DRESSING FRAME STYLE 1627: Brand: 3M™ TEGADERM™

## (undated) DEVICE — SUT GUT CHRM 0 CT 27IN 914H

## (undated) DEVICE — SLV SCD CALF HEMOFORCE DVT THERP REPROC MD

## (undated) DEVICE — GLV SURG BIOGEL LTX PF 6 1/2

## (undated) DEVICE — ST COL BERKELY TBG

## (undated) DEVICE — NDL SPINE 22G 31/2IN BLK

## (undated) DEVICE — CANSTR SXN UTER NO FLTR SURG

## (undated) DEVICE — LOU D & C HYSTEROSCOPY: Brand: MEDLINE INDUSTRIES, INC.

## (undated) DEVICE — ST TBG ENDOMAT HYSTSCPY

## (undated) DEVICE — HOSE BT TO BT VAC CURETTAGE SNGL PT USE

## (undated) DEVICE — SEAL HYSTERSCOPE/OUTFLOW CHANNEL MYOSURE

## (undated) DEVICE — SUT VIC 4/0 KS 27IN VCP662H

## (undated) DEVICE — OSC HYSTEROSCOPY: Brand: MEDLINE INDUSTRIES, INC.

## (undated) DEVICE — SACK GZ PERM

## (undated) DEVICE — DRAPE,UNDERBUTTOCKS,PCH,STERILE: Brand: MEDLINE

## (undated) DEVICE — NEEDLE, QUINCKE 22GX3.5": Brand: MEDLINE INDUSTRIES, INC.

## (undated) DEVICE — 3M™ STERI-STRIP™ REINFORCED ADHESIVE SKIN CLOSURES, R1547, 1/2 IN X 4 IN (12 MM X 100 MM), 6 STRIPS/ENVELOPE: Brand: 3M™ STERI-STRIP™

## (undated) DEVICE — Device: Brand: PORTEX

## (undated) DEVICE — PAD SANI MAXI W/ADHS SNG WRP 11IN

## (undated) DEVICE — GLV SURG SENSICARE MICRO PF LF 6.5 STRL